# Patient Record
Sex: FEMALE | Race: WHITE | NOT HISPANIC OR LATINO | Employment: FULL TIME | URBAN - METROPOLITAN AREA
[De-identification: names, ages, dates, MRNs, and addresses within clinical notes are randomized per-mention and may not be internally consistent; named-entity substitution may affect disease eponyms.]

---

## 2017-07-18 ENCOUNTER — HOSPITAL ENCOUNTER (OUTPATIENT)
Dept: CARDIOLOGY | Facility: HOSPITAL | Age: 22
Discharge: HOME | End: 2017-07-18
Attending: INTERNAL MEDICINE | Admitting: INTERNAL MEDICINE

## 2019-07-12 ENCOUNTER — OFFICE VISIT (OUTPATIENT)
Dept: FAMILY MEDICINE CLINIC | Facility: CLINIC | Age: 24
End: 2019-07-12

## 2019-07-12 VITALS
HEIGHT: 63 IN | SYSTOLIC BLOOD PRESSURE: 108 MMHG | TEMPERATURE: 97.4 F | BODY MASS INDEX: 23.74 KG/M2 | OXYGEN SATURATION: 98 % | HEART RATE: 88 BPM | DIASTOLIC BLOOD PRESSURE: 71 MMHG | WEIGHT: 134 LBS

## 2019-07-12 DIAGNOSIS — L71.0 PERIORAL DERMATITIS: Primary | ICD-10-CM

## 2019-07-12 PROBLEM — R00.2 PALPITATION: Status: ACTIVE | Noted: 2017-06-15

## 2019-07-12 PROCEDURE — 99213 OFFICE O/P EST LOW 20 MIN: CPT | Performed by: FAMILY MEDICINE

## 2019-07-12 RX ORDER — SERTRALINE HYDROCHLORIDE 25 MG/1
TABLET, FILM COATED ORAL
Refills: 0 | COMMUNITY
Start: 2019-07-03 | End: 2019-07-26

## 2019-07-12 NOTE — PROGRESS NOTES
"Subjective   Chief Complaint   Patient presents with   • Rash     on face     Alaina Arzate is a 23 y.o. female.     Patient Care Team:  Maria M Villeda MD as PCP - General    She is coming in today with her mother to discuss skin rash on her face.  She reports that she first noted some redness and skin irritation on her face 3 days ago.  It is around her mouth and is slightly spreading on the right side to the cheek area but it is also on the left side of her lips.  It is slightly itchy.  She denies any other rashes.  She has tried over-the-counter cortisone cream but that did not help actually made lipid worse.  She reports that she recently started some new body wash and she wonders if this is irritating her skin.       The following portions of the patient's history were reviewed and updated as appropriate: allergies, current medications, past family history, past medical history, past social history, past surgical history and problem list.    Social History     Socioeconomic History   • Marital status: Single     Spouse name: Not on file   • Number of children: Not on file   • Years of education: Not on file   • Highest education level: Not on file       Review of Systems   Constitutional: Negative for chills, fatigue and fever.   Respiratory: Negative for shortness of breath, wheezing and stridor.    Skin: Positive for rash. Negative for color change, dry skin, pallor and skin lesions.   Hematological: Negative for adenopathy. Does not bruise/bleed easily.     Visit Vitals  /71 (BP Location: Right arm, Patient Position: Sitting, Cuff Size: Adult)   Pulse 88   Temp 97.4 °F (36.3 °C) (Axillary)   Ht 160 cm (63\")   Wt 60.8 kg (134 lb)   SpO2 98%   BMI 23.74 kg/m²       Current Outpatient Medications:   •  sertraline (ZOLOFT) 25 MG tablet, , Disp: , Rfl: 0    Objective   Physical Exam   Constitutional: She is oriented to person, place, and time. She appears well-developed and well-nourished.   HENT: "   Head: Normocephalic and atraumatic.   Eyes: Conjunctivae and EOM are normal. Pupils are equal, round, and reactive to light.   Neck: Normal range of motion. Neck supple.   Neurological: She is alert and oriented to person, place, and time.   Skin: Skin is warm and dry.   There is mild macular rash noted on face around the oral area sparing the vermilion line.  No signs of infection or inflammation.  No petechiae.   Nursing note and vitals reviewed.               Assessment/Plan   Problems Addressed this Visit        Musculoskeletal and Integument    Perioral dermatitis - Primary      I suspect that her skin issues are due to perioral dermatitis.  This was discussed with the patient and her mother in details.  Skin care was discussed she was advised to stop using any skin products with fragrances.  She will use Cetaphil for cleansing and is a lotion.  I also gave her samples of Eucrisa cream to use as needed to see if this is going to help with  her skin issues.  She is to call us back if not any better.  Other approaches might need to be tried at this point.  All questions were answered.         Requested Prescriptions      No prescriptions requested or ordered in this encounter

## 2019-07-26 ENCOUNTER — OFFICE VISIT (OUTPATIENT)
Dept: FAMILY MEDICINE CLINIC | Facility: CLINIC | Age: 24
End: 2019-07-26

## 2019-07-26 VITALS
BODY MASS INDEX: 22.88 KG/M2 | OXYGEN SATURATION: 100 % | HEIGHT: 64 IN | HEART RATE: 81 BPM | TEMPERATURE: 98.5 F | DIASTOLIC BLOOD PRESSURE: 69 MMHG | SYSTOLIC BLOOD PRESSURE: 106 MMHG | WEIGHT: 134 LBS

## 2019-07-26 DIAGNOSIS — F41.9 ANXIETY: ICD-10-CM

## 2019-07-26 DIAGNOSIS — Z00.00 WELLNESS EXAMINATION: Primary | ICD-10-CM

## 2019-07-26 LAB
ALBUMIN SERPL-MCNC: 4.1 G/DL (ref 3.5–4.8)
ALBUMIN/GLOB SERPL: 1.4 G/DL (ref 1–1.7)
ALP SERPL-CCNC: 36 U/L (ref 32–91)
ALT SERPL W P-5'-P-CCNC: 14 U/L (ref 14–54)
ANION GAP SERPL CALCULATED.3IONS-SCNC: 11.3 MMOL/L (ref 5–15)
ARTICHOKE IGE QN: 120 MG/DL (ref 0–100)
AST SERPL-CCNC: 17 U/L (ref 15–41)
BILIRUB SERPL-MCNC: 0.8 MG/DL (ref 0.3–1.2)
BUN BLD-MCNC: 6 MG/DL (ref 8–20)
BUN/CREAT SERPL: 12 (ref 5.4–26.2)
CALCIUM SPEC-SCNC: 9.3 MG/DL (ref 8.9–10.3)
CHLORIDE SERPL-SCNC: 104 MMOL/L (ref 101–111)
CHOLEST SERPL-MCNC: 182 MG/DL
CO2 SERPL-SCNC: 25 MMOL/L (ref 22–32)
CREAT BLD-MCNC: 0.5 MG/DL (ref 0.4–1)
GFR SERPL CREATININE-BSD FRML MDRD: >150 ML/MIN/1.73
GLOBULIN UR ELPH-MCNC: 3 GM/DL (ref 2.5–3.8)
GLUCOSE BLD-MCNC: 91 MG/DL (ref 65–99)
HDLC SERPL QL: 3.79
HDLC SERPL-MCNC: 48 MG/DL
LDLC/HDLC SERPL: 2.43 {RATIO}
POTASSIUM BLD-SCNC: 4.3 MMOL/L (ref 3.6–5.1)
PROT SERPL-MCNC: 7.1 G/DL (ref 6.1–7.9)
SODIUM BLD-SCNC: 136 MMOL/L (ref 136–144)
TRIGL SERPL-MCNC: 88 MG/DL
VLDLC SERPL-MCNC: 17.6 MG/DL

## 2019-07-26 PROCEDURE — 80053 COMPREHEN METABOLIC PANEL: CPT | Performed by: FAMILY MEDICINE

## 2019-07-26 PROCEDURE — 99395 PREV VISIT EST AGE 18-39: CPT | Performed by: FAMILY MEDICINE

## 2019-07-26 PROCEDURE — 90471 IMMUNIZATION ADMIN: CPT | Performed by: FAMILY MEDICINE

## 2019-07-26 PROCEDURE — 36415 COLL VENOUS BLD VENIPUNCTURE: CPT | Performed by: FAMILY MEDICINE

## 2019-07-26 PROCEDURE — 80061 LIPID PANEL: CPT | Performed by: FAMILY MEDICINE

## 2019-07-26 PROCEDURE — 90651 9VHPV VACCINE 2/3 DOSE IM: CPT | Performed by: FAMILY MEDICINE

## 2019-07-26 RX ORDER — OLOPATADINE HYDROCHLORIDE 2 MG/ML
SOLUTION/ DROPS OPHTHALMIC
COMMUNITY
Start: 2019-07-19 | End: 2020-09-30

## 2019-07-26 NOTE — PROGRESS NOTES
"Subjective   Chief Complaint   Patient presents with   • Annual Exam     and fasting labs     Alaina Arzate is a 23 y.o. female.     Subjective  Alaina Arzate is an 23 y.o. female who presents for [facility] physical exam. She denies any current medical problems or concerns. Questionnaire and forms reviewed with patient and responses verified. Immunizations are up to date. Patient has received 2 doses of MMR vaccine. Varicella immunity: confirmed by vaccine administration.    The following portions of the patient's history were reviewed and updated as appropriate: allergies, current medications, past family history, past medical history, past social history, past surgical history and problem list.    Review of Systems  A comprehensive review of systems was negative.      Objective   /69 (BP Location: Right arm, Patient Position: Sitting, Cuff Size: Adult)   Pulse 81   Temp 98.5 °F (36.9 °C) (Oral)   Ht 161.9 cm (63.75\")   Wt 60.8 kg (134 lb)   SpO2 100%   BMI 23.18 kg/m²   /69 (BP Location: Right arm, Patient Position: Sitting, Cuff Size: Adult)   Pulse 81   Temp 98.5 °F (36.9 °C) (Oral)   Ht 161.9 cm (63.75\")   Wt 60.8 kg (134 lb)   SpO2 100%   BMI 23.18 kg/m²   General appearance: alert, appears stated age and cooperative  Head: Normocephalic, without obvious abnormality, atraumatic  Eyes: conjunctivae/corneas clear. PERRL, EOM's intact. Fundi benign.  Ears: normal TM's and external ear canals both ears  Throat: lips, mucosa, and tongue normal; teeth and gums normal  Neck: no adenopathy, no carotid bruit, no JVD, supple, symmetrical, trachea midline and thyroid not enlarged, symmetric, no tenderness/mass/nodules  Lungs: clear to auscultation bilaterally  Heart: regular rate and rhythm, S1, S2 normal, no murmur, click, rub or gallop  Abdomen: soft, non-tender; bowel sounds normal; no masses,  no organomegaly  Extremities: extremities normal, atraumatic, no cyanosis or edema  Skin: Skin " color, texture, turgor normal. No rashes or lesions  Lymph nodes: Cervical, supraclavicular, and axillary nodes normal.  Neurologic: Grossly normal     Assessment/Plan  Satisfactory college physical exam.     1. Completed, signed and returned forms.  2. Reviewed health maintenance, contraception, STDs, and substance abuse.  3. Follow up will be as needed.               History reviewed. No pertinent past medical history.  History reviewed. No pertinent surgical history.  No Known Allergies  Social History     Socioeconomic History   • Marital status: Single     Spouse name: Not on file   • Number of children: Not on file   • Years of education: Not on file   • Highest education level: Not on file   Tobacco Use   • Smoking status: Never Smoker   • Smokeless tobacco: Never Used   Substance and Sexual Activity   • Alcohol use: No     Frequency: Never   • Drug use: No     Social History     Tobacco Use   Smoking Status Never Smoker   Smokeless Tobacco Never Used       family history is not on file.  Current Outpatient Medications on File Prior to Visit   Medication Sig Dispense Refill   • olopatadine (PATADAY) 0.2 % solution ophthalmic solution      • [DISCONTINUED] sertraline (ZOLOFT) 25 MG tablet   0     No current facility-administered medications on file prior to visit.      Patient Active Problem List   Diagnosis   • Palpitation   • Perioral dermatitis   • Wellness examination       The following portions of the patient's history were reviewed and updated as appropriate: allergies, current medications, past family history, past medical history, past social history, past surgical history and problem list.    Review of Systems   Constitutional: Negative for chills and fever.   HENT: Negative for sinus pressure and sore throat.    Eyes: Negative for blurred vision.   Respiratory: Negative for cough and shortness of breath.    Cardiovascular: Negative for chest pain and palpitations.   Gastrointestinal: Negative for  "abdominal pain.   Endocrine: Negative for polyuria.   Skin: Negative for rash.   Neurological: Negative for dizziness and headache.   Hematological: Negative for adenopathy.   Psychiatric/Behavioral: Negative for depressed mood.       Objective   /69 (BP Location: Right arm, Patient Position: Sitting, Cuff Size: Adult)   Pulse 81   Temp 98.5 °F (36.9 °C) (Oral)   Ht 161.9 cm (63.75\")   Wt 60.8 kg (134 lb)   SpO2 100%   BMI 23.18 kg/m²   Physical Exam   Constitutional: She is oriented to person, place, and time. She appears well-developed. No distress.   HENT:   Head: Normocephalic.   Right Ear: Hearing, tympanic membrane and ear canal normal.   Left Ear: Hearing, tympanic membrane and ear canal normal.   Eyes: Conjunctivae, EOM and lids are normal. Pupils are equal, round, and reactive to light.   Neck: Normal range of motion. Neck supple. No thyroid mass and no thyromegaly present.   Cardiovascular: Normal rate and regular rhythm.   Pulmonary/Chest: Effort normal and breath sounds normal.   Abdominal: Soft. Bowel sounds are normal. There is no tenderness.   Musculoskeletal: Normal range of motion.   Lymphadenopathy:     She has no cervical adenopathy.   Neurological: She is alert and oriented to person, place, and time.   Skin: Skin is warm and dry.   Psychiatric: She has a normal mood and affect. Her speech is normal.   Nursing note and vitals reviewed.      No visits with results within 1 Week(s) from this visit.   Latest known visit with results is:   No results found for any previous visit.           Assessment/Plan   Problems Addressed this Visit        Other    Wellness examination - Primary    Relevant Orders    Comprehensive Metabolic Panel    Lipid Panel          Alaina was seen today for annual exam.    Diagnoses and all orders for this visit:    Wellness examination  -     Comprehensive Metabolic Panel  -     Lipid Panel    Other orders  -     sertraline (ZOLOFT) 50 MG tablet; Take 1 tablet " by mouth Daily.

## 2019-07-26 NOTE — PATIENT INSTRUCTIONS
Preventive Care for Young Adults, Female  The transition to life after high school as a young adult can be a stressful time with many changes. You may start seeing a primary care physician instead of a pediatrician. This is the time when your health care becomes your responsibility.  Preventive care refers to lifestyle choices and visits with your health care provider that can promote health and wellness.  What does preventive care include?  · A yearly physical exam. This is also called an annual wellness visit.  · Dental exams once or twice a year.  · Routine eye exams. Ask your health care provider how often you should have your eyes checked.  · Personal lifestyle choices, including:  ? Daily care of your teeth and gums.  ? Regular physical activity.  ? Eating a healthy diet.  ? Avoiding tobacco and drug use.  ? Avoiding or limiting alcohol use.  ? Practicing safe sex.  ? Taking vitamin and mineral supplements as recommended by your health care provider.  What happens during an annual wellness visit?  Preventive care starts with a yearly visit to your primary care physician. The services and screenings done by your health care provider during your annual wellness visit will depend on your overall health, lifestyle risk factors, and family history of disease.  Counseling  Your health care provider may ask you questions about:  · Past medical problems and your family’s medical history.  · Medicines or supplements you take.  · Health insurance and access to health care.  · Alcohol, tobacco, and drug use.  · Your safety at home, work, or school.  · Access to firearms.  · Emotional well-being and how you cope with stress.  · Relationship well-being.  · Diet, exercise, and sleep habits.  · Your sexual health and activity.  · Your methods of birth control.  · Your menstrual cycle.  · Your pregnancy history.    Screening  You may have the following tests or measurements:  · Height, weight, and BMI.  · Blood  pressure.  · Lipid and cholesterol levels.  · Tuberculosis skin test.  · Skin exam.  · Vision and hearing tests.  · Screening test for hepatitis.  · Screening tests for sexually transmitted diseases (STDs), if you are at risk.  · BRCA-related cancer screening. This may be done if you have a family history of breast, ovarian, tubal, or peritoneal cancers.  · Pelvic exam and Pap test. This may be done every 3 years starting at age 21.    Vaccines  Your health care provider may recommend certain vaccines, such as:  · Influenza vaccine. This is recommended every year.  · Tetanus, diphtheria, and acellular pertussis (Tdap, Td) vaccine. You may need a Td booster every 10 years.  · Varicella vaccine. You may need this if you have not been vaccinated.  · HPV vaccine. If you are 26 or younger, you may need three doses over 6 months.  · Measles, mumps, and rubella (MMR) vaccine. You may need at least one dose of MMR. You may also need a second dose.  · Pneumococcal 13-valent conjugate (PCV13) vaccine. You may need this if you have certain conditions and were not previously vaccinated.  · Pneumococcal polysaccharide (PPSV23) vaccine. You may need one or two doses if you smoke cigarettes or if you have certain conditions.  · Meningococcal vaccine. One dose is recommended if you are age 19-21 years and a first-year college student living in a residence day, or if you have one of several medical conditions. You may also need additional booster doses.  · Hepatitis A vaccine. You may need this if you have certain conditions or if you travel or work in places where you may be exposed to hepatitis A.  · Hepatitis B vaccine. You may need this if you have certain conditions or if you travel or work in places where you may be exposed to hepatitis B.  · Haemophilus influenzae type b (Hib) vaccine. You may need this if you have certain risk factors.    Talk to your health care provider about which screenings and vaccines you need and how  often you need them.  What steps can I take to develop healthy behaviors?  · Have regular preventive health care visits with your primary care physician and dentist.  · Eat a healthy diet.  · Drink enough fluid to keep your urine pale yellow.  · Stay active. Exercise at least 30 minutes 5 or more days of the week.  · Use alcohol responsibly.  · Maintain a healthy weight.  · Do not use any products that contain nicotine, such as cigarettes, chewing tobacco, and e-cigarettes. If you need help quitting, ask your health care provider.  · Do not use drugs.  · Practice safe sex.  · Use birth control (contraception) to prevent unwanted pregnancy. If you plan to become pregnant, see your health care provider for a pre-conception visit.  · Find healthy ways to manage stress.  How can I protect myself from injury?  Injuries from violence or accidents are the leading cause of death among young adults and can often be prevented. Take these steps to help protect yourself:  · Always wear your seat belt while driving or riding in a vehicle.  · Do not drive if you have been drinking alcohol. Do not ride with someone who has been drinking.  · Do not drive when you are tired or distracted. Do not text while driving.  · Wear a helmet and other protective equipment during sports activities.  · If you have firearms in your house, make sure you follow all gun safety procedures.  · Seek help if you have been bullied, physically abused, or sexually abused.  · Use the Internet responsibly to avoid dangers such as online bullying and online sexual predators.    What can I do to cope with stress?  Young adults may face many new challenges that can be stressful, such as finding a job, going to college, moving away from home, managing money, being in a relationship, getting , and having children. To manage stress:  · Avoid known stressful situations when you can.  · Exercise regularly.  · Find a stress-reducing activity that works best  for you. Examples include meditation, yoga, listening to music, or reading.  · Spend time in nature.  · Keep a journal to write about your stress and how you respond.  · Talk to your health care provider about stress. He or she may suggest counseling.  · Spend time with supportive friends or family.  · Do not cope with stress by:  ? Drinking alcohol or using drugs.  ? Smoking cigarettes.  ? Eating.    Where can I get more information?  Learn more about preventive care and healthy habits from:  · American College of Obstetricians and Gynecologists: www.acog.org/Patients  · U.S. Preventive Services Task Force: www.uspreventiveservicestaskforce.org/Tools/ConsumerInfo/Index/information-for-consumers  · National Adolescent and Young Adult Health Information Center: http://nahic.[facility]/resource-center/  · American Academy of Pediatrics Bright Futures: https://brightfutures.aap.org  · Society for Adolescent Health and Medicine: www.adolescenthealth.org/Resources/Clinical-Care-Resources/Mental-Health/Mental-Health-Resources-For-Adolesc.aspx  · HealthCare.gov: www.healthcare.gov/young-adults/coverage/    This information is not intended to replace advice given to you by your health care provider. Make sure you discuss any questions you have with your health care provider.  Document Released: 05/04/2017 Document Revised: 07/31/2018 Document Reviewed: 05/04/2017  Elsevier Interactive Patient Education © 2019 [employer]vier Inc[employer]

## 2019-07-29 ENCOUNTER — CLINICAL SUPPORT (OUTPATIENT)
Dept: FAMILY MEDICINE CLINIC | Facility: CLINIC | Age: 24
End: 2019-07-29

## 2019-07-29 DIAGNOSIS — Z11.1 SCREENING EXAMINATION FOR PULMONARY TUBERCULOSIS: Primary | ICD-10-CM

## 2019-07-29 PROCEDURE — 86580 TB INTRADERMAL TEST: CPT | Performed by: FAMILY MEDICINE

## 2019-08-01 ENCOUNTER — CLINICAL SUPPORT (OUTPATIENT)
Dept: FAMILY MEDICINE CLINIC | Facility: CLINIC | Age: 24
End: 2019-08-01

## 2019-08-01 LAB
INDURATION: 0 MM (ref 0–10)
Lab: NORMAL
Lab: NORMAL
TB SKIN TEST: NEGATIVE

## 2019-08-05 ENCOUNTER — CLINICAL SUPPORT (OUTPATIENT)
Dept: FAMILY MEDICINE CLINIC | Facility: CLINIC | Age: 24
End: 2019-08-05

## 2019-08-05 DIAGNOSIS — Z11.1 SCREENING EXAMINATION FOR PULMONARY TUBERCULOSIS: Primary | ICD-10-CM

## 2019-08-05 PROCEDURE — 86580 TB INTRADERMAL TEST: CPT | Performed by: FAMILY MEDICINE

## 2019-08-07 LAB
INDURATION: 0 MM (ref 0–10)
Lab: NORMAL
Lab: NORMAL
TB SKIN TEST: NEGATIVE

## 2019-10-02 ENCOUNTER — FLU SHOT (OUTPATIENT)
Dept: FAMILY MEDICINE CLINIC | Facility: CLINIC | Age: 24
End: 2019-10-02

## 2019-10-02 DIAGNOSIS — Z23 NEED FOR IMMUNIZATION AGAINST INFLUENZA: Primary | ICD-10-CM

## 2019-10-02 PROCEDURE — 90471 IMMUNIZATION ADMIN: CPT | Performed by: FAMILY MEDICINE

## 2019-10-02 PROCEDURE — 90674 CCIIV4 VAC NO PRSV 0.5 ML IM: CPT | Performed by: FAMILY MEDICINE

## 2019-11-21 RX ORDER — CLONAZEPAM 0.5 MG/1
0.5 TABLET ORAL 2 TIMES DAILY PRN
Qty: 20 TABLET | Refills: 0 | Status: SHIPPED | OUTPATIENT
Start: 2019-11-21 | End: 2021-10-19

## 2019-12-04 ENCOUNTER — TELEPHONE (OUTPATIENT)
Dept: FAMILY MEDICINE CLINIC | Facility: CLINIC | Age: 24
End: 2019-12-04

## 2019-12-04 NOTE — TELEPHONE ENCOUNTER
I spoke to the pts mom and they are getting a bill for $144.64 because the pt was seen in March and in July and both visits were coded as well exam 22004, can the most recent visit be changed?

## 2020-04-22 RX ORDER — ERYTHROMYCIN 5 MG/G
OINTMENT OPHTHALMIC NIGHTLY
Qty: 3.5 G | Refills: 1 | Status: SHIPPED | OUTPATIENT
Start: 2020-04-22 | End: 2020-09-30

## 2020-09-30 ENCOUNTER — OFFICE VISIT (OUTPATIENT)
Dept: FAMILY MEDICINE CLINIC | Facility: CLINIC | Age: 25
End: 2020-09-30

## 2020-09-30 VITALS
BODY MASS INDEX: 21.28 KG/M2 | DIASTOLIC BLOOD PRESSURE: 75 MMHG | OXYGEN SATURATION: 98 % | WEIGHT: 123 LBS | HEART RATE: 92 BPM | TEMPERATURE: 98.9 F | SYSTOLIC BLOOD PRESSURE: 128 MMHG

## 2020-09-30 DIAGNOSIS — Z23 NEED FOR VACCINATION: ICD-10-CM

## 2020-09-30 DIAGNOSIS — R20.2 RIGHT HAND PARESTHESIA: ICD-10-CM

## 2020-09-30 DIAGNOSIS — F41.9 ANXIETY: Primary | ICD-10-CM

## 2020-09-30 DIAGNOSIS — H01.004 BLEPHARITIS OF LEFT UPPER EYELID, UNSPECIFIED TYPE: ICD-10-CM

## 2020-09-30 PROCEDURE — 90686 IIV4 VACC NO PRSV 0.5 ML IM: CPT | Performed by: FAMILY MEDICINE

## 2020-09-30 PROCEDURE — 99214 OFFICE O/P EST MOD 30 MIN: CPT | Performed by: FAMILY MEDICINE

## 2020-09-30 PROCEDURE — 90471 IMMUNIZATION ADMIN: CPT | Performed by: FAMILY MEDICINE

## 2020-09-30 RX ORDER — TOBRAMYCIN AND DEXAMETHASONE 3; 1 MG/ML; MG/ML
1 SUSPENSION/ DROPS OPHTHALMIC 2 TIMES DAILY
Qty: 5 ML | Refills: 1 | Status: SHIPPED | OUTPATIENT
Start: 2020-09-30 | End: 2021-10-19

## 2020-09-30 RX ORDER — METRONIDAZOLE 500 MG/1
500 TABLET ORAL 2 TIMES DAILY
COMMUNITY
Start: 2020-09-24 | End: 2021-10-19

## 2020-09-30 RX ORDER — ESCITALOPRAM OXALATE 5 MG/1
5 TABLET ORAL DAILY
Qty: 90 TABLET | Refills: 1 | Status: SHIPPED | OUTPATIENT
Start: 2020-09-30 | End: 2021-04-16 | Stop reason: SDUPTHER

## 2020-09-30 NOTE — PROGRESS NOTES
Subjective   Chief Complaint   Patient presents with   • Anxiety   • Eye Problem     knot on lt eyelid     Alaina Arzate is a 24 y.o. female.     Numbness of right hand after using scissors for a long time at work.  Looking for a new job.  Had a job interview yesterday.     She has had some swelling of her left upper eye lid.  She saw her dermatologist recently for other issues and asked them about it.  They would not address it and told her to go to see an ophthalmologist.     Anxiety  Presents for follow-up visit. Symptoms include excessive worry, nervous/anxious behavior and panic. Patient reports no chest pain, confusion, decreased concentration, depressed mood, dizziness, feeling of choking, muscle tension, palpitations or shortness of breath. Symptoms occur most days. The severity of symptoms is causing significant distress. The quality of sleep is good.       Eye Problem   The left eye is affected. This is a new problem. The current episode started in the past 7 days. The problem occurs constantly. The problem has been unchanged. There was no injury mechanism. The patient is experiencing no pain. There is no known exposure to pink eye. Pertinent negatives include no blurred vision, eye discharge, double vision, eye redness, fever, foreign body sensation, itching, photophobia or recent URI. She has tried nothing for the symptoms.      No past medical history on file.  No past surgical history on file.  No Known Allergies  Social History     Socioeconomic History   • Marital status: Single     Spouse name: Not on file   • Number of children: Not on file   • Years of education: Not on file   • Highest education level: Not on file   Tobacco Use   • Smoking status: Never Smoker   • Smokeless tobacco: Never Used   Substance and Sexual Activity   • Alcohol use: No     Frequency: Never   • Drug use: No     Social History     Tobacco Use   Smoking Status Never Smoker   Smokeless Tobacco Never Used       family  history is not on file.  Current Outpatient Medications on File Prior to Visit   Medication Sig Dispense Refill   • metroNIDAZOLE (FLAGYL) 500 MG tablet Take 500 mg by mouth 2 (Two) Times a Day.     • triamcinolone (KENALOG) 0.1 % ointment APPLY SPARINGLY TO AFFECTED AREA QD TO BID     • clonazePAM (KlonoPIN) 0.5 MG tablet Take 1 tablet by mouth 2 (Two) Times a Day As Needed for Anxiety. 20 tablet 0   • [DISCONTINUED] erythromycin (ROMYCIN) 5 MG/GM ophthalmic ointment Administer  to both eyes Every Night. 3.5 g 1   • [DISCONTINUED] olopatadine (PATADAY) 0.2 % solution ophthalmic solution      • [DISCONTINUED] sertraline (ZOLOFT) 50 MG tablet Take 1 tablet by mouth Daily. 90 tablet 1     No current facility-administered medications on file prior to visit.      Patient Active Problem List   Diagnosis   • Palpitation   • Perioral dermatitis   • Wellness examination       The following portions of the patient's history were reviewed and updated as appropriate: allergies, current medications, past family history, past medical history, past social history, past surgical history and problem list.    Review of Systems   Constitutional: Negative for chills and fever.   HENT: Negative for sinus pressure, sore throat and swollen glands.    Eyes: Negative for blurred vision, double vision, photophobia, pain, discharge, redness and itching.   Respiratory: Negative for cough, shortness of breath and wheezing.    Cardiovascular: Negative for chest pain and palpitations.   Gastrointestinal: Negative for abdominal pain.   Endocrine: Negative for polyuria.   Genitourinary: Negative for difficulty urinating.   Skin: Negative for rash.   Neurological: Negative for dizziness, seizures, headache and confusion.   Hematological: Negative for adenopathy.   Psychiatric/Behavioral: Negative for decreased concentration and depressed mood. The patient is nervous/anxious.        Objective   /75 (BP Location: Right arm, Patient Position:  Sitting, Cuff Size: Adult)   Pulse 92   Temp 98.9 °F (37.2 °C)   Wt 55.8 kg (123 lb)   SpO2 98%   BMI 21.28 kg/m²   Physical Exam  Constitutional:       General: She is not in acute distress.     Appearance: She is well-developed.   HENT:      Head: Normocephalic.   Eyes:      General: Lids are normal.      Conjunctiva/sclera: Conjunctivae normal.   Neck:      Musculoskeletal: Normal range of motion.      Thyroid: No thyroid mass or thyromegaly.      Trachea: Trachea normal.   Cardiovascular:      Rate and Rhythm: Normal rate and regular rhythm.      Heart sounds: Normal heart sounds.   Pulmonary:      Effort: Pulmonary effort is normal.      Breath sounds: Normal breath sounds.   Abdominal:      Palpations: Abdomen is soft.   Musculoskeletal:      Comments: Right index finger with mild lateral swelling.  Left upper eye lid with small papule and mild lid irritation.   Lymphadenopathy:      Cervical: No cervical adenopathy.   Skin:     General: Skin is warm and dry.   Neurological:      Mental Status: She is alert and oriented to person, place, and time.   Psychiatric:         Attention and Perception: She is attentive.         Mood and Affect: Mood normal.         Speech: Speech normal.         Behavior: Behavior normal.         No visits with results within 1 Week(s) from this visit.   Latest known visit with results is:   Clinical Support on 08/05/2019   Component Date Value Ref Range Status   • TB Skin Test 08/07/2019 Negative   Final   • Induration 08/07/2019 0  0 - 10 mm Final   • Injection Date & Time 08/07/2019 8/5/2019 8:30am   Final   • Read Date & Time 08/07/2019 8/7/19 8:30am   Final           Assessment/Plan   Alaina was seen today for anxiety and eye problem.    Diagnoses and all orders for this visit:    Anxiety  Comments:  Previously on Zoloft.  Start Lexapro 5 mg  Orders:  -     escitalopram (Lexapro) 5 MG tablet; Take 1 tablet by mouth Daily.    Right hand paresthesia  Comments:  Due to overuse  of scissors    Blepharitis of left upper eyelid, unspecified type  Comments:  Try anti inlammatory eye drops.    Orders:  -     tobramycin-dexamethasone (TobraDex) 0.3-0.1 % ophthalmic suspension; Administer 1 drop to both eyes 2 (two) times a day.    Need for vaccination  -     FluLaval Quad >6 Months ([id number])  -     SCANNED - INFLUENZA

## 2020-11-24 ENCOUNTER — CLINICAL SUPPORT (OUTPATIENT)
Dept: FAMILY MEDICINE CLINIC | Facility: CLINIC | Age: 25
End: 2020-11-24

## 2020-11-24 DIAGNOSIS — R31.9 HEMATURIA, UNSPECIFIED TYPE: Primary | ICD-10-CM

## 2020-11-24 LAB
BILIRUB BLD-MCNC: NEGATIVE MG/DL
CLARITY, POC: ABNORMAL
COLOR UR: YELLOW
GLUCOSE UR STRIP-MCNC: NEGATIVE MG/DL
KETONES UR QL: NEGATIVE
LEUKOCYTE EST, POC: ABNORMAL
NITRITE UR-MCNC: NEGATIVE MG/ML
PH UR: 5.5 [PH] (ref 5–8)
PROT UR STRIP-MCNC: NEGATIVE MG/DL
RBC # UR STRIP: ABNORMAL /UL
SP GR UR: 1.02 (ref 1–1.03)
UROBILINOGEN UR QL: NORMAL

## 2020-11-24 PROCEDURE — 87086 URINE CULTURE/COLONY COUNT: CPT | Performed by: FAMILY MEDICINE

## 2020-11-24 PROCEDURE — 81003 URINALYSIS AUTO W/O SCOPE: CPT | Performed by: FAMILY MEDICINE

## 2020-11-24 RX ORDER — SULFAMETHOXAZOLE AND TRIMETHOPRIM 800; 160 MG/1; MG/1
1 TABLET ORAL 2 TIMES DAILY
Qty: 14 TABLET | Refills: 0 | Status: SHIPPED | OUTPATIENT
Start: 2020-11-24 | End: 2021-10-19

## 2020-11-25 LAB — BACTERIA SPEC AEROBE CULT: NO GROWTH

## 2021-04-16 DIAGNOSIS — F41.9 ANXIETY: ICD-10-CM

## 2021-04-16 RX ORDER — ESCITALOPRAM OXALATE 5 MG/1
5 TABLET ORAL DAILY
Qty: 90 TABLET | Refills: 1 | Status: SHIPPED | OUTPATIENT
Start: 2021-04-16 | End: 2021-06-25 | Stop reason: SDUPTHER

## 2021-06-25 DIAGNOSIS — F41.9 ANXIETY: ICD-10-CM

## 2021-06-25 RX ORDER — ESCITALOPRAM OXALATE 5 MG/1
5 TABLET ORAL DAILY
Qty: 101 TABLET | Refills: 1 | Status: SHIPPED | OUTPATIENT
Start: 2021-06-25 | End: 2021-09-28

## 2021-09-28 DIAGNOSIS — F41.9 ANXIETY: ICD-10-CM

## 2021-09-28 RX ORDER — ESCITALOPRAM OXALATE 5 MG/1
TABLET ORAL
Qty: 90 TABLET | Refills: 1 | Status: SHIPPED | OUTPATIENT
Start: 2021-09-28 | End: 2022-03-18 | Stop reason: SDUPTHER

## 2021-10-19 ENCOUNTER — OFFICE VISIT (OUTPATIENT)
Dept: FAMILY MEDICINE CLINIC | Facility: CLINIC | Age: 26
End: 2021-10-19

## 2021-10-19 VITALS
HEART RATE: 94 BPM | WEIGHT: 129 LBS | TEMPERATURE: 98.4 F | BODY MASS INDEX: 22.32 KG/M2 | OXYGEN SATURATION: 98 % | SYSTOLIC BLOOD PRESSURE: 100 MMHG | DIASTOLIC BLOOD PRESSURE: 69 MMHG

## 2021-10-19 DIAGNOSIS — J02.9 SORE THROAT: Primary | ICD-10-CM

## 2021-10-19 LAB
EXPIRATION DATE: NORMAL
INTERNAL CONTROL: NORMAL
Lab: NORMAL
S PYO AG THROAT QL: NEGATIVE

## 2021-10-19 PROCEDURE — 99213 OFFICE O/P EST LOW 20 MIN: CPT | Performed by: FAMILY MEDICINE

## 2021-10-19 PROCEDURE — 87880 STREP A ASSAY W/OPTIC: CPT | Performed by: FAMILY MEDICINE

## 2021-10-19 RX ORDER — AMOXICILLIN 500 MG/1
500 TABLET, FILM COATED ORAL 2 TIMES DAILY
Qty: 20 TABLET | Refills: 0 | Status: SHIPPED | OUTPATIENT
Start: 2021-10-19 | End: 2021-10-29

## 2021-10-19 RX ORDER — DROSPIRENONE AND ETHINYL ESTRADIOL 0.03MG-3MG
1 KIT ORAL DAILY
COMMUNITY
Start: 2021-10-05

## 2021-10-19 RX ORDER — TRETINOIN 1 MG/G
CREAM TOPICAL
COMMUNITY
Start: 2021-08-09 | End: 2022-03-18

## 2021-10-19 NOTE — ASSESSMENT & PLAN NOTE
Discussed conservative management,  salt water gargles and fluids.  Rx amoxicillin take as directed..

## 2021-10-19 NOTE — PROGRESS NOTES
Subjective   Alaina Arzate is a 26 y.o. female.     Sore Throat   This is a new problem. The current episode started in the past 7 days. The problem has been gradually worsening. There has been no fever. The pain is moderate. Associated symptoms include trouble swallowing. Pertinent negatives include no congestion, coughing, diarrhea, ear discharge, ear pain, headaches, hoarse voice, neck pain, shortness of breath or swollen glands. She has tried nothing for the symptoms.        The following portions of the patient's history were reviewed and updated as appropriate: past medical history, past social history, past surgical history and problem list.    Review of Systems   Constitutional: Negative for fever.   HENT: Positive for sore throat and trouble swallowing. Negative for congestion, ear discharge, ear pain, hoarse voice and swollen glands.    Respiratory: Negative for cough, shortness of breath and wheezing.    Gastrointestinal: Negative for diarrhea.   Musculoskeletal: Negative for neck pain.   Neurological: Negative for headache.       Objective   Physical Exam  Vitals reviewed.   Constitutional:       General: She is not in acute distress.  HENT:      Right Ear: Tympanic membrane, ear canal and external ear normal. There is no impacted cerumen.      Left Ear: Tympanic membrane, ear canal and external ear normal. There is no impacted cerumen.      Mouth/Throat:      Mouth: Mucous membranes are moist.      Pharynx: Posterior oropharyngeal erythema present.   Eyes:      Conjunctiva/sclera: Conjunctivae normal.   Pulmonary:      Effort: Pulmonary effort is normal.   Musculoskeletal:      Cervical back: Normal range of motion and neck supple. No tenderness.   Lymphadenopathy:      Cervical: Cervical adenopathy present.   Neurological:      Mental Status: She is alert and oriented to person, place, and time.       Vitals:    10/19/21 1136   BP: 100/69   Pulse: 94   Temp: 98.4 °F (36.9 °C)   SpO2: 98%     Current  Outpatient Medications on File Prior to Visit   Medication Sig Dispense Refill   • Valarie 3-0.03 MG per tablet Take 1 tablet by mouth Daily.     • tretinoin (RETIN-A) 0.1 % cream APPLY EXTERNALLY TO FACE EVERY NIGHT AT BEDTIME     • escitalopram (LEXAPRO) 5 MG tablet TAKE 1 TABLET BY MOUTH DAILY. TAKE 2 TABLETS DAILY THE WEEK PRIOR TO MENSES 90 tablet 1   • triamcinolone (KENALOG) 0.1 % ointment APPLY SPARINGLY TO AFFECTED AREA QD TO BID     • [DISCONTINUED] clonazePAM (KlonoPIN) 0.5 MG tablet Take 1 tablet by mouth 2 (Two) Times a Day As Needed for Anxiety. 20 tablet 0   • [DISCONTINUED] metroNIDAZOLE (FLAGYL) 500 MG tablet Take 500 mg by mouth 2 (Two) Times a Day.     • [DISCONTINUED] sulfamethoxazole-trimethoprim (Bactrim DS) 800-160 MG per tablet Take 1 tablet by mouth 2 (Two) Times a Day. 14 tablet 0   • [DISCONTINUED] tobramycin-dexamethasone (TobraDex) 0.3-0.1 % ophthalmic suspension Administer 1 drop to both eyes 2 (two) times a day. 5 mL 1     No current facility-administered medications on file prior to visit.           Assessment/Plan   Problems Addressed this Visit        ENT    Sore throat - Primary     Discussed conservative management,  salt water gargles and fluids.  Rx amoxicillin take as directed..         Relevant Orders    POCT rapid strep A      Diagnoses       Codes Comments    Sore throat    -  Primary ICD-10-CM: J02.9  ICD-9-CM: 462

## 2022-03-18 ENCOUNTER — OFFICE VISIT (OUTPATIENT)
Dept: FAMILY MEDICINE CLINIC | Facility: CLINIC | Age: 27
End: 2022-03-18

## 2022-03-18 ENCOUNTER — LAB (OUTPATIENT)
Dept: FAMILY MEDICINE CLINIC | Facility: CLINIC | Age: 27
End: 2022-03-18

## 2022-03-18 VITALS
BODY MASS INDEX: 21.62 KG/M2 | HEART RATE: 96 BPM | TEMPERATURE: 98.7 F | WEIGHT: 125 LBS | OXYGEN SATURATION: 98 % | DIASTOLIC BLOOD PRESSURE: 81 MMHG | SYSTOLIC BLOOD PRESSURE: 120 MMHG

## 2022-03-18 DIAGNOSIS — F41.9 ANXIETY: Primary | ICD-10-CM

## 2022-03-18 DIAGNOSIS — R55 NEAR SYNCOPE: ICD-10-CM

## 2022-03-18 LAB
ALBUMIN SERPL-MCNC: 4.4 G/DL (ref 3.5–5.2)
ALBUMIN/GLOB SERPL: 1.6 G/DL
ALP SERPL-CCNC: 37 U/L (ref 39–117)
ALT SERPL W P-5'-P-CCNC: 24 U/L (ref 1–33)
ANION GAP SERPL CALCULATED.3IONS-SCNC: 6.2 MMOL/L (ref 5–15)
AST SERPL-CCNC: 17 U/L (ref 1–32)
BASOPHILS # BLD AUTO: 0.04 10*3/MM3 (ref 0–0.2)
BASOPHILS NFR BLD AUTO: 0.8 % (ref 0–1.5)
BILIRUB SERPL-MCNC: 0.4 MG/DL (ref 0–1.2)
BUN SERPL-MCNC: 8 MG/DL (ref 6–20)
BUN/CREAT SERPL: 11.9 (ref 7–25)
CALCIUM SPEC-SCNC: 9.8 MG/DL (ref 8.6–10.5)
CHLORIDE SERPL-SCNC: 104 MMOL/L (ref 98–107)
CO2 SERPL-SCNC: 26.8 MMOL/L (ref 22–29)
CREAT SERPL-MCNC: 0.67 MG/DL (ref 0.57–1)
DEPRECATED RDW RBC AUTO: 39 FL (ref 37–54)
EGFRCR SERPLBLD CKD-EPI 2021: 125.3 ML/MIN/1.73
EOSINOPHIL # BLD AUTO: 0.07 10*3/MM3 (ref 0–0.4)
EOSINOPHIL NFR BLD AUTO: 1.4 % (ref 0.3–6.2)
ERYTHROCYTE [DISTWIDTH] IN BLOOD BY AUTOMATED COUNT: 11.5 % (ref 12.3–15.4)
GLOBULIN UR ELPH-MCNC: 2.7 GM/DL
GLUCOSE SERPL-MCNC: 90 MG/DL (ref 65–99)
HCT VFR BLD AUTO: 40.5 % (ref 34–46.6)
HGB BLD-MCNC: 13.4 G/DL (ref 12–15.9)
IMM GRANULOCYTES # BLD AUTO: 0.01 10*3/MM3 (ref 0–0.05)
IMM GRANULOCYTES NFR BLD AUTO: 0.2 % (ref 0–0.5)
LYMPHOCYTES # BLD AUTO: 1.08 10*3/MM3 (ref 0.7–3.1)
LYMPHOCYTES NFR BLD AUTO: 21 % (ref 19.6–45.3)
MCH RBC QN AUTO: 30.8 PG (ref 26.6–33)
MCHC RBC AUTO-ENTMCNC: 33.1 G/DL (ref 31.5–35.7)
MCV RBC AUTO: 93.1 FL (ref 79–97)
MONOCYTES # BLD AUTO: 0.44 10*3/MM3 (ref 0.1–0.9)
MONOCYTES NFR BLD AUTO: 8.6 % (ref 5–12)
NEUTROPHILS NFR BLD AUTO: 3.5 10*3/MM3 (ref 1.7–7)
NEUTROPHILS NFR BLD AUTO: 68 % (ref 42.7–76)
NRBC BLD AUTO-RTO: 0 /100 WBC (ref 0–0.2)
PLATELET # BLD AUTO: 285 10*3/MM3 (ref 140–450)
PMV BLD AUTO: 12.4 FL (ref 6–12)
POTASSIUM SERPL-SCNC: 4.5 MMOL/L (ref 3.5–5.2)
PROT SERPL-MCNC: 7.1 G/DL (ref 6–8.5)
RBC # BLD AUTO: 4.35 10*6/MM3 (ref 3.77–5.28)
SODIUM SERPL-SCNC: 137 MMOL/L (ref 136–145)
TSH SERPL DL<=0.05 MIU/L-ACNC: 1.19 UIU/ML (ref 0.27–4.2)
WBC NRBC COR # BLD: 5.14 10*3/MM3 (ref 3.4–10.8)

## 2022-03-18 PROCEDURE — 36415 COLL VENOUS BLD VENIPUNCTURE: CPT | Performed by: FAMILY MEDICINE

## 2022-03-18 PROCEDURE — 85025 COMPLETE CBC W/AUTO DIFF WBC: CPT | Performed by: FAMILY MEDICINE

## 2022-03-18 PROCEDURE — 99213 OFFICE O/P EST LOW 20 MIN: CPT | Performed by: FAMILY MEDICINE

## 2022-03-18 PROCEDURE — 80053 COMPREHEN METABOLIC PANEL: CPT | Performed by: FAMILY MEDICINE

## 2022-03-18 PROCEDURE — 84443 ASSAY THYROID STIM HORMONE: CPT | Performed by: FAMILY MEDICINE

## 2022-03-18 RX ORDER — ESCITALOPRAM OXALATE 5 MG/1
7.5 TABLET ORAL DAILY
Qty: 135 TABLET | Refills: 1
Start: 2022-03-18 | End: 2022-04-08

## 2022-03-18 NOTE — ASSESSMENT & PLAN NOTE
Stay hydrated.  Avoid caffeine.  May need forms completed for work for an accommodation to be able to change jobs.

## 2022-03-18 NOTE — PROGRESS NOTES
Chief Complaint  Anxiety (F/u, wants to discuss maybe changing medication or change dose. Also wanted to talk about her jaw, she had woke up with it locked )    Subjective          Alaina Arzate presents to [facility]  She was at work this week and doing a stressful, physical job for several hours at [employer] and felt like she was going to pass out.  She did not faint or pass out but just felt like she could.  Her symptoms lasted a few minutes and resolved spontaneously.   She get worse symptoms after squatting down and getting up suddenly - sees stars, lightheaded - resolves in a few minutes. No further symptoms since then.     Anxiety  Presents for follow-up visit. Symptoms include dizziness, nervous/anxious behavior and palpitations. Patient reports no chest pain or suicidal ideas. Symptoms occur most days. The severity of symptoms is interfering with daily activities. The quality of sleep is fair.           Objective   Vital Signs:   /81 (BP Location: Left arm, Patient Position: Sitting, Cuff Size: Adult)   Pulse 96   Temp 98.7 °F (37.1 °C) (Infrared)   Wt 56.7 kg (125 lb)   SpO2 98%   BMI 21.62 kg/m²     Physical Exam  Vitals and nursing note reviewed.   Constitutional:       General: She is not in acute distress.     Appearance: She is well-developed.   HENT:      Head: Normocephalic.   Eyes:      General: Lids are normal.   Neck:      Thyroid: No thyroid mass or thyromegaly.      Trachea: Trachea normal.   Cardiovascular:      Rate and Rhythm: Normal rate and regular rhythm.      Heart sounds: Normal heart sounds.   Pulmonary:      Effort: Pulmonary effort is normal.      Breath sounds: Normal breath sounds.   Musculoskeletal:      Cervical back: Normal range of motion.   Lymphadenopathy:      Cervical: No cervical adenopathy.   Skin:     General: Skin is warm and dry.   Neurological:      Mental Status: She is alert and oriented to person, place, and time.    Psychiatric:         Attention and Perception: She is attentive.         Mood and Affect: Mood normal.         Speech: Speech normal.         Behavior: Behavior normal.        Result Review :   The following data was reviewed by: Maria M Villeda MD on 03/18/2022:                Assessment and Plan    Diagnoses and all orders for this visit:    1. Anxiety (Primary)  -     escitalopram (LEXAPRO) 5 MG tablet; Take 1.5 tablets by mouth Daily.  Dispense: 135 tablet; Refill: 1    2. Near syncope  Assessment & Plan:  Stay hydrated.  Avoid caffeine.  May need forms completed for work for an accommodation to be able to change jobs.    Orders:  -     TSH  -     CBC & Differential  -     Comprehensive Metabolic Panel      Follow Up   No follow-ups on file.  Patient was given instructions and counseling regarding her condition or for health maintenance advice. Please see specific information pulled into the AVS if appropriate.

## 2022-04-08 DIAGNOSIS — F41.9 ANXIETY: ICD-10-CM

## 2022-04-08 RX ORDER — ESCITALOPRAM OXALATE 5 MG/1
TABLET ORAL
Qty: 90 TABLET | Refills: 1 | Status: SHIPPED | OUTPATIENT
Start: 2022-04-08 | End: 2022-05-20

## 2022-05-20 RX ORDER — ESCITALOPRAM OXALATE 10 MG/1
10 TABLET ORAL DAILY
Qty: 90 TABLET | Refills: 1 | Status: SHIPPED | OUTPATIENT
Start: 2022-05-20 | End: 2022-11-18

## 2022-09-30 ENCOUNTER — OFFICE VISIT (OUTPATIENT)
Dept: FAMILY MEDICINE CLINIC | Facility: CLINIC | Age: 27
End: 2022-09-30

## 2022-09-30 VITALS
WEIGHT: 125 LBS | DIASTOLIC BLOOD PRESSURE: 68 MMHG | HEIGHT: 63 IN | HEART RATE: 74 BPM | BODY MASS INDEX: 22.15 KG/M2 | RESPIRATION RATE: 16 BRPM | SYSTOLIC BLOOD PRESSURE: 103 MMHG | OXYGEN SATURATION: 97 % | TEMPERATURE: 98 F

## 2022-09-30 DIAGNOSIS — Z00.00 WELLNESS EXAMINATION: Primary | ICD-10-CM

## 2022-09-30 DIAGNOSIS — Z23 NEED FOR VACCINATION: ICD-10-CM

## 2022-09-30 PROBLEM — J02.9 SORE THROAT: Status: RESOLVED | Noted: 2021-10-19 | Resolved: 2022-09-30

## 2022-09-30 PROCEDURE — 99395 PREV VISIT EST AGE 18-39: CPT | Performed by: FAMILY MEDICINE

## 2022-09-30 PROCEDURE — 90471 IMMUNIZATION ADMIN: CPT | Performed by: FAMILY MEDICINE

## 2022-09-30 PROCEDURE — 90686 IIV4 VACC NO PRSV 0.5 ML IM: CPT | Performed by: FAMILY MEDICINE

## 2022-10-16 PROBLEM — Z23 NEED FOR VACCINATION: Status: ACTIVE | Noted: 2022-10-16

## 2022-11-18 RX ORDER — ESCITALOPRAM OXALATE 10 MG/1
10 TABLET ORAL DAILY
Qty: 90 TABLET | Refills: 1 | Status: SHIPPED | OUTPATIENT
Start: 2022-11-18

## 2022-11-28 ENCOUNTER — HOSPITAL ENCOUNTER (OUTPATIENT)
Facility: HOSPITAL | Age: 27
Discharge: HOME | End: 2022-11-28
Attending: EMERGENCY MEDICINE

## 2022-11-28 VITALS
TEMPERATURE: 99.5 F | RESPIRATION RATE: 18 BRPM | SYSTOLIC BLOOD PRESSURE: 109 MMHG | HEART RATE: 93 BPM | WEIGHT: 127 LBS | BODY MASS INDEX: 22.5 KG/M2 | DIASTOLIC BLOOD PRESSURE: 62 MMHG | OXYGEN SATURATION: 99 % | HEIGHT: 63 IN

## 2022-11-28 DIAGNOSIS — J10.1 INFLUENZA A: Primary | ICD-10-CM

## 2022-11-28 LAB
FLUAV SUBTYP SPEC NAA+PROBE: DETECTED
FLUBV RNA ISLT QL NAA+PROBE: NOT DETECTED
SARS-COV-2 RNA RESP QL NAA+PROBE: NOT DETECTED

## 2022-11-28 PROCEDURE — 87636 SARSCOV2 & INF A&B AMP PRB: CPT | Performed by: NURSE PRACTITIONER

## 2022-11-28 PROCEDURE — 87636 SARSCOV2 & INF A&B AMP PRB: CPT | Performed by: EMERGENCY MEDICINE

## 2022-11-28 PROCEDURE — EDLOS: Performed by: NURSE PRACTITIONER

## 2022-11-28 PROCEDURE — G0463 HOSPITAL OUTPT CLINIC VISIT: HCPCS | Performed by: NURSE PRACTITIONER

## 2022-11-28 PROCEDURE — 99203 OFFICE O/P NEW LOW 30 MIN: CPT | Performed by: NURSE PRACTITIONER

## 2023-03-10 ENCOUNTER — OFFICE VISIT (OUTPATIENT)
Dept: FAMILY MEDICINE CLINIC | Facility: CLINIC | Age: 28
End: 2023-03-10
Payer: COMMERCIAL

## 2023-03-10 VITALS
SYSTOLIC BLOOD PRESSURE: 104 MMHG | BODY MASS INDEX: 22.11 KG/M2 | WEIGHT: 124.8 LBS | TEMPERATURE: 98.2 F | HEIGHT: 63 IN | DIASTOLIC BLOOD PRESSURE: 69 MMHG | HEART RATE: 76 BPM | OXYGEN SATURATION: 96 %

## 2023-03-10 DIAGNOSIS — R30.0 DYSURIA: Primary | ICD-10-CM

## 2023-03-10 LAB
BILIRUB BLD-MCNC: NEGATIVE MG/DL
CLARITY, POC: CLEAR
COLOR UR: YELLOW
EXPIRATION DATE: ABNORMAL
GLUCOSE UR STRIP-MCNC: NEGATIVE MG/DL
KETONES UR QL: NEGATIVE
LEUKOCYTE EST, POC: NEGATIVE
Lab: ABNORMAL
NITRITE UR-MCNC: NEGATIVE MG/ML
PH UR: 8.5 [PH] (ref 5–8)
PROT UR STRIP-MCNC: NEGATIVE MG/DL
RBC # UR STRIP: NEGATIVE /UL
SP GR UR: 1.02 (ref 1–1.03)
UROBILINOGEN UR QL: ABNORMAL

## 2023-03-10 PROCEDURE — 81003 URINALYSIS AUTO W/O SCOPE: CPT | Performed by: FAMILY MEDICINE

## 2023-03-10 PROCEDURE — 99213 OFFICE O/P EST LOW 20 MIN: CPT | Performed by: FAMILY MEDICINE

## 2023-03-10 RX ORDER — TRIAMCINOLONE ACETONIDE 1 MG/G
CREAM TOPICAL
COMMUNITY
Start: 2023-02-17

## 2023-03-10 NOTE — PROGRESS NOTES
"Chief Complaint  Dysuria (X 2 days- with pelvic pain )    Subjective        Alaina Arzate presents to [facility]  Dysuria   This is a new problem. The current episode started in the past 7 days. The problem has been waxing and waning. The quality of the pain is described as burning. The pain is mild. There has been no fever. Pertinent negatives include no chills, flank pain, frequency, hematuria, nausea or urgency. She has tried increased fluids for the symptoms. The treatment provided moderate relief.       Objective   Vital Signs:  /69 (BP Location: Left arm, Patient Position: Sitting, Cuff Size: Small Adult)   Pulse 76   Temp 98.2 °F (36.8 °C) (Infrared)   Ht 160 cm (63\")   Wt 56.6 kg (124 lb 12.8 oz)   SpO2 96%   BMI 22.11 kg/m²   Estimated body mass index is 22.11 kg/m² as calculated from the following:    Height as of this encounter: 160 cm (63\").    Weight as of this encounter: 56.6 kg (124 lb 12.8 oz).       BMI is within normal parameters. No other follow-up for BMI required.      Physical Exam  Constitutional:       General: She is not in acute distress.     Appearance: Normal appearance. She is well-developed.   HENT:      Head: Normocephalic.   Eyes:      General: Lids are normal.   Neck:      Thyroid: No thyroid mass or thyromegaly.      Trachea: Trachea normal.   Cardiovascular:      Rate and Rhythm: Normal rate and regular rhythm.      Heart sounds: Normal heart sounds.   Pulmonary:      Effort: Pulmonary effort is normal.      Breath sounds: Normal breath sounds.   Abdominal:      Palpations: Abdomen is soft.   Musculoskeletal:      Cervical back: Normal range of motion.   Lymphadenopathy:      Cervical: No cervical adenopathy.   Skin:     General: Skin is warm and dry.   Neurological:      Mental Status: She is oriented to person, place, and time.   Psychiatric:         Attention and Perception: She is attentive.         Mood and Affect: Mood normal.   "       Speech: Speech normal.         Behavior: Behavior normal.        Result Review :  The following data was reviewed by: Maria M Villeda MD on 03/10/2023:  Common labs    Common Labs 3/18/22 3/18/22    1022 1022   Glucose  90   BUN  8   Creatinine  0.67   Sodium  137   Potassium  4.5   Chloride  104   Calcium  9.8   Albumin  4.40   Total Bilirubin  0.4   Alkaline Phosphatase  37 (A)   AST (SGOT)  17   ALT (SGPT)  24   WBC 5.14    Hemoglobin 13.4    Hematocrit 40.5    Platelets 285    (A) Abnormal value                         Assessment and Plan   Diagnoses and all orders for this visit:    1. Dysuria (Primary)  Assessment & Plan:  Symptoms have improved and her UA today is normal.  Continue pushing fluids and call back if symptoms return.  Tylenol 1-2 tabs po q4h prn      Orders:  -     POCT urinalysis dipstick, automated           Follow Up   No follow-ups on file.  Patient was given instructions and counseling regarding her condition or for health maintenance advice. Please see specific information pulled into the AVS if appropriate.

## 2023-03-10 NOTE — ASSESSMENT & PLAN NOTE
Symptoms have improved and her UA today is normal.  Continue pushing fluids and call back if symptoms return.  Tylenol 1-2 tabs po q4h prn

## 2023-03-20 DIAGNOSIS — G47.9 SLEEP DISTURBANCE: Primary | ICD-10-CM

## 2023-04-12 RX ORDER — ESCITALOPRAM OXALATE 10 MG/1
10 TABLET ORAL DAILY
Qty: 90 TABLET | Refills: 1 | Status: SHIPPED | OUTPATIENT
Start: 2023-04-12

## 2023-05-26 RX ORDER — BACLOFEN 10 MG/1
10 TABLET ORAL 3 TIMES DAILY PRN
Qty: 30 TABLET | Refills: 0 | Status: SHIPPED | OUTPATIENT
Start: 2023-05-26

## 2023-06-02 ENCOUNTER — OFFICE VISIT (OUTPATIENT)
Dept: FAMILY MEDICINE CLINIC | Facility: CLINIC | Age: 28
End: 2023-06-02
Payer: COMMERCIAL

## 2023-06-02 VITALS
BODY MASS INDEX: 22.61 KG/M2 | TEMPERATURE: 99.6 F | WEIGHT: 127.6 LBS | HEIGHT: 63 IN | SYSTOLIC BLOOD PRESSURE: 113 MMHG | HEART RATE: 82 BPM | OXYGEN SATURATION: 98 % | DIASTOLIC BLOOD PRESSURE: 73 MMHG

## 2023-06-02 DIAGNOSIS — M54.50 LEFT-SIDED LOW BACK PAIN WITHOUT SCIATICA, UNSPECIFIED CHRONICITY: Primary | ICD-10-CM

## 2023-06-02 PROCEDURE — 99213 OFFICE O/P EST LOW 20 MIN: CPT | Performed by: FAMILY MEDICINE

## 2023-06-02 RX ORDER — UREA 40 %
CREAM (GRAM) TOPICAL
COMMUNITY
Start: 2023-05-09 | End: 2023-06-18

## 2023-06-02 NOTE — PROGRESS NOTES
"Chief Complaint  Back Pain (X 1 month )    Subjective        Alaina Arzate presents to [facility]  History of Present Illness  She works at [employer].  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 She has had worse pain with this activity.  She is requesting a letter for work accommodations to allow her to not do that particular job.   Back Pain  This is a new problem. The current episode started more than 1 month ago. The problem occurs intermittently. The problem has been waxing and waning since onset. The pain is present in the lumbar spine and thoracic spine. The quality of the pain is described as aching. She has tried heat, ice and muscle relaxant for the symptoms. The treatment provided mild relief.     Objective   Vital Signs:  /73 (BP Location: Left arm, Patient Position: Sitting, Cuff Size: Adult)   Pulse 82   Temp 99.6 °F (37.6 °C) (Infrared)   Ht 160 cm (63\")   Wt 57.9 kg (127 lb 9.6 oz)   SpO2 98%   BMI 22.60 kg/m²   Estimated body mass index is 22.6 kg/m² as calculated from the following:    Height as of this encounter: 160 cm (63\").    Weight as of this encounter: 57.9 kg (127 lb 9.6 oz).       BMI is within normal parameters. No other follow-up for BMI required.      Physical Exam  Vitals and nursing note reviewed.   Constitutional:       General: She is not in acute distress.     Appearance: She is well-developed.   HENT:      Head: Normocephalic.   Eyes:      General: Lids are normal.   Neck:      Thyroid: No thyroid mass or thyromegaly.      Trachea: Trachea normal.   Cardiovascular:      Rate and Rhythm: Normal rate and regular rhythm.      Heart sounds: Normal heart sounds.   Pulmonary:      Effort: Pulmonary effort is normal.      Breath sounds: Normal breath sounds.   Musculoskeletal:      Cervical back: Normal range of motion.      Lumbar back: Tenderness present.   Lymphadenopathy:      Cervical: No cervical adenopathy. "   Skin:     General: Skin is warm and dry.   Neurological:      Mental Status: She is alert and oriented to person, place, and time.   Psychiatric:         Attention and Perception: She is attentive.         Mood and Affect: Mood normal.         Speech: Speech normal.         Behavior: Behavior normal.      Result Review :  The following data was reviewed by: Maria M Villeda MD on 06/02/2023:                   Assessment and Plan   Diagnoses and all orders for this visit:    1. Left-sided low back pain without sciatica, unspecified chronicity (Primary)  Assessment & Plan:  Apply a compressive  bandage. Rest the affected painful area.  Apply cold compresses intermittently as needed.  As pain recedes, begin normal activities slowly as tolerated.  Call if symptoms persist. Tylenol 1-2 tabs po q4h prn                   Follow Up   No follow-ups on file.  Patient was given instructions and counseling regarding her condition or for health maintenance advice. Please see specific information pulled into the AVS if appropriate.

## 2023-06-14 NOTE — PROGRESS NOTES
[facility]  [address] [place] [phone]  Phone   Fax       Alaina Arzate  [phone]   1995  27 y.o.  female      Referring Provider and PCP: Maria M Villeda MD    Type of service: Initial Sleep Medicine Consult.  Date of service: 6/15/2023          CHIEF COMPLAINT: Suspected sleep apnea      HISTORY OF PRESENT ILLNESS:  Thank you for asking us to see Alaina Arzate.  Alaina Arzate 27 y.o. was seen today on 6/15/2023 at [facility].  Patient presents today to look into possible sleep apnea.  She has been having some jaw cracking, teeth grinding, and morning headaches.  She saw her dentist and they wanted to rule out sleep apnea prior to considering dental appliance for grinding her teeth.  She does have family history of sleep apnea in her grandparents and she believes her father may have it as well but no formal diagnosis.  She has been told she snores in the past.  She is not sure if she snores currently.  She does wake up with morning headaches.  Even after getting 8 hours of sleep at night she wakes up tired, not restored.  She does feel that this could be medication related as well.  Her boyfriend is with her in the office today, per patient preference.  Denies sudden episodes of sleeping during the day.  Denies narcolepsy symptoms.      SLEEP HISTORY:  Sleep schedule:  Bedtime: 10 PM weeknights, 11 PM to midnight weekends  Wake time: 5:50 AM weekdays, 8-9 weekends  Normally takes about 10-15 minutes to fall asleep  Average hours of sleep: 8  Number of naps per day: 0    Symptoms:   In addition to the above, patient reports the following associated symptoms:  Have you ever awakened gasping for breath, coughing, choking: No   Change in weight:  No   Morning headaches:  Yes   Awaken with a sore throat or dry mouth:  No   Leg jerking at night:  No   Creepy crawly feeling in legs/urge to move legs: No   Teeth grinding: Yes   Have  "you ever awakened at night with a sour taste or burning sensation in your chest:  No   Do you have muscle weakness with laughing or anger:  No   Have you ever felt paralyzed while going to sleep or waking up:  No   Sleepwalking: No   Nightmares: No   Nocturia (urination at night): 0-1 times per night  Memory Problem: No     Medical Conditions (PMH):   Anxiety  Teeth grinding    Social history:  Do you drive a commercial vehicle:  No   Shift work:  No   Tobacco use:  No  Alcohol use: 0 per week  Caffeinated drinks: 3 per day  Occupation:  at [employer]    Family History (parents and siblings) (pertaining to sleep medicine):  Sleep apnea  Hypertension  Heart disease  COPD  Diabetes  Obesity  Cancer    Medications: reviewed    Allergies:  Patient has no known allergies.      REVIEW OF SYSTEMS:  Pertinent positive symptoms are:  Snoring  [place] Sleepiness Scale of Total score: 4   Morning fatigue  Headaches  Nocturnal bruxism  Anxiety      PHYSICAL EXAM:  CONSTITUTINONAL:   Vitals:    06/15/23 1300   BP: 97/66   BP Location: Left arm   Patient Position: Sitting   Cuff Size: Adult   Pulse: 78   Resp: 12   SpO2: 99%   Weight: 58.3 kg (128 lb 9.6 oz)   Height: 160 cm (63\")    Body mass index is 22.78 kg/m².   HEAD: atraumatic, normocephalic   NOSE: nasal passages are clear  THROAT: tonsils are nonenlarged, Mallampati class II-III and narrow  NECK: Neck Circumference: 12.75 inches, trachea is midline  RESPIRATORY SYSTEM: Respirations even, unlabored, normal rate  CARDIOVASULAR SYSTEM: Normal rate, no edema  NEUROLOGICAL SYSTEM: Alert and oriented x 3, normal gait  PSYCHIATRIC SYSTEM: Mood is normal/ appropriate     Office notes from care team reviewed. Office note dated/10/23, reviewed.            ASSESSMENT AND PLAN:   Suspected sleep apnea: patient's symptoms and physical examination are concerning for possible sleep apnea (G47.30).   I discussed the signs, symptoms, and pathophysiology of sleep apnea with this " patient.  I also discussed the potential complications of untreated sleep apnea including but not limited to resistant hypertension, insulin resistance, pulmonary hypertension, atrial fibrillation, stroke, nonrestorative sleep with hypersomnia which can increase risk for motor vehicle accidents, etc.   Different testing methods including home-based and lab based sleep studies were discussed with this patient.   Based on patient history and physical examination, the most appropriate study is sleep study.  The order for the sleep study is placed in [facility].  The test will be scheduled after prior authorization has been obtained through patient's insurance.  Treatment and management will be discussed in more detail with this patient after the test is completed.  All questions were answered to patient's satisfaction.   Snoring (R06.83): snoring is the sound created by turbulent airflow vibrating upper airway soft tissue.  I have also discussed factors affecting snoring including sleep deprivation, sleeping on the back and alcohol ingestion. To minimize snoring, patient is advised to have adequate sleep, sleep on their side, and avoid alcohol and sedative medications around bedtime.   Anxiety  Family history sleep apnea  Nocturnal bruxism: If sleep study does not show evidence of sleep apnea she will follow-up with her dentist for possible bite guard or further recommendations per dentist    Patient will follow-up after study, 31 to 90 days after PAP therapy initiated if applicable, or contact the office sooner for questions or concerns. Patient's questions were answered.            Thank you again for asking me to consult on this patient.  Please do not hesitate to call me if you have additional questions or concerns.       Juliana Cantu DNP, APRN  [facility]

## 2023-06-15 ENCOUNTER — OFFICE VISIT (OUTPATIENT)
Dept: SLEEP MEDICINE | Facility: CLINIC | Age: 28
End: 2023-06-15
Payer: COMMERCIAL

## 2023-06-15 VITALS
SYSTOLIC BLOOD PRESSURE: 97 MMHG | WEIGHT: 128.6 LBS | DIASTOLIC BLOOD PRESSURE: 66 MMHG | HEART RATE: 78 BPM | BODY MASS INDEX: 22.79 KG/M2 | HEIGHT: 63 IN | OXYGEN SATURATION: 99 % | RESPIRATION RATE: 12 BRPM

## 2023-06-15 DIAGNOSIS — Z82.0 FAMILY HISTORY OF SLEEP APNEA: ICD-10-CM

## 2023-06-15 DIAGNOSIS — G47.8 NON-RESTORATIVE SLEEP: Primary | ICD-10-CM

## 2023-06-15 DIAGNOSIS — G47.63 SLEEP-RELATED BRUXISM: ICD-10-CM

## 2023-06-15 DIAGNOSIS — R06.83 SNORING: ICD-10-CM

## 2023-06-18 NOTE — ASSESSMENT & PLAN NOTE
Apply a compressive  bandage. Rest the affected painful area.  Apply cold compresses intermittently as needed.  As pain recedes, begin normal activities slowly as tolerated.  Call if symptoms persist. Tylenol 1-2 tabs po q4h prn

## 2023-10-06 ENCOUNTER — HOSPITAL ENCOUNTER (OUTPATIENT)
Dept: GENERAL RADIOLOGY | Facility: HOSPITAL | Age: 28
Discharge: HOME | End: 2023-10-06
Admitting: FAMILY MEDICINE
Payer: COMMERCIAL

## 2023-10-06 DIAGNOSIS — R10.32 LLQ ABDOMINAL PAIN: Primary | ICD-10-CM

## 2023-10-06 DIAGNOSIS — R10.32 LLQ ABDOMINAL PAIN: ICD-10-CM

## 2023-10-06 PROCEDURE — 74018 RADEX ABDOMEN 1 VIEW: CPT

## 2023-11-10 RX ORDER — ESCITALOPRAM OXALATE 10 MG/1
10 TABLET ORAL DAILY
Qty: 90 TABLET | Refills: 1 | Status: SHIPPED | OUTPATIENT
Start: 2023-11-10

## 2024-01-03 RX ORDER — SUMATRIPTAN 50 MG/1
TABLET, FILM COATED ORAL
Qty: 9 TABLET | Refills: 3 | Status: SHIPPED | OUTPATIENT
Start: 2024-01-03

## 2024-03-26 RX ORDER — RIMEGEPANT SULFATE 75 MG/75MG
75 TABLET, ORALLY DISINTEGRATING ORAL
Qty: 4 TABLET | Refills: 0 | COMMUNITY
Start: 2024-03-26

## 2024-04-07 RX ORDER — RIMEGEPANT SULFATE 75 MG/75MG
75 TABLET, ORALLY DISINTEGRATING ORAL
Qty: 16 TABLET | Refills: 5 | Status: SHIPPED | OUTPATIENT
Start: 2024-04-07

## 2024-05-13 DIAGNOSIS — F41.9 ANXIETY: Primary | ICD-10-CM

## 2024-05-14 RX ORDER — DESVENLAFAXINE 25 MG/1
25 TABLET, EXTENDED RELEASE ORAL DAILY
Qty: 30 TABLET | Refills: 1 | Status: SHIPPED | OUTPATIENT
Start: 2024-05-14

## 2024-05-15 DIAGNOSIS — Z00.00 WELLNESS EXAMINATION: Primary | ICD-10-CM

## 2024-05-16 ENCOUNTER — LAB (OUTPATIENT)
Dept: FAMILY MEDICINE CLINIC | Facility: CLINIC | Age: 29
End: 2024-05-16
Payer: COMMERCIAL

## 2024-05-16 LAB
ALBUMIN SERPL-MCNC: 4.3 G/DL (ref 3.5–5.2)
ALBUMIN/GLOB SERPL: 1.6 G/DL
ALP SERPL-CCNC: 38 U/L (ref 39–117)
ALT SERPL W P-5'-P-CCNC: 53 U/L (ref 1–33)
ANION GAP SERPL CALCULATED.3IONS-SCNC: 9.7 MMOL/L (ref 5–15)
AST SERPL-CCNC: 26 U/L (ref 1–32)
BASOPHILS # BLD AUTO: 0.03 10*3/MM3 (ref 0–0.2)
BASOPHILS NFR BLD AUTO: 0.6 % (ref 0–1.5)
BILIRUB SERPL-MCNC: 0.3 MG/DL (ref 0–1.2)
BUN SERPL-MCNC: 8 MG/DL (ref 6–20)
BUN/CREAT SERPL: 11.8 (ref 7–25)
CALCIUM SPEC-SCNC: 9.5 MG/DL (ref 8.6–10.5)
CHLORIDE SERPL-SCNC: 102 MMOL/L (ref 98–107)
CHOLEST SERPL-MCNC: 183 MG/DL (ref 0–200)
CO2 SERPL-SCNC: 26.3 MMOL/L (ref 22–29)
CREAT SERPL-MCNC: 0.68 MG/DL (ref 0.57–1)
DEPRECATED RDW RBC AUTO: 38.8 FL (ref 37–54)
EGFRCR SERPLBLD CKD-EPI 2021: 123.4 ML/MIN/1.73
EOSINOPHIL # BLD AUTO: 0.14 10*3/MM3 (ref 0–0.4)
EOSINOPHIL NFR BLD AUTO: 2.6 % (ref 0.3–6.2)
ERYTHROCYTE [DISTWIDTH] IN BLOOD BY AUTOMATED COUNT: 11.5 % (ref 12.3–15.4)
GLOBULIN UR ELPH-MCNC: 2.7 GM/DL
GLUCOSE SERPL-MCNC: 95 MG/DL (ref 65–99)
HCT VFR BLD AUTO: 41.4 % (ref 34–46.6)
HDLC SERPL-MCNC: 80 MG/DL (ref 40–60)
HGB BLD-MCNC: 14 G/DL (ref 12–15.9)
IMM GRANULOCYTES # BLD AUTO: 0.01 10*3/MM3 (ref 0–0.05)
IMM GRANULOCYTES NFR BLD AUTO: 0.2 % (ref 0–0.5)
LDLC SERPL CALC-MCNC: 85 MG/DL (ref 0–100)
LDLC/HDLC SERPL: 1.04 {RATIO}
LYMPHOCYTES # BLD AUTO: 1.42 10*3/MM3 (ref 0.7–3.1)
LYMPHOCYTES NFR BLD AUTO: 26.2 % (ref 19.6–45.3)
MCH RBC QN AUTO: 31.7 PG (ref 26.6–33)
MCHC RBC AUTO-ENTMCNC: 33.8 G/DL (ref 31.5–35.7)
MCV RBC AUTO: 93.7 FL (ref 79–97)
MONOCYTES # BLD AUTO: 0.41 10*3/MM3 (ref 0.1–0.9)
MONOCYTES NFR BLD AUTO: 7.6 % (ref 5–12)
NEUTROPHILS NFR BLD AUTO: 3.4 10*3/MM3 (ref 1.7–7)
NEUTROPHILS NFR BLD AUTO: 62.8 % (ref 42.7–76)
NRBC BLD AUTO-RTO: 0 /100 WBC (ref 0–0.2)
PLATELET # BLD AUTO: 273 10*3/MM3 (ref 140–450)
PMV BLD AUTO: 11.8 FL (ref 6–12)
POTASSIUM SERPL-SCNC: 4.1 MMOL/L (ref 3.5–5.2)
PROT SERPL-MCNC: 7 G/DL (ref 6–8.5)
RBC # BLD AUTO: 4.42 10*6/MM3 (ref 3.77–5.28)
SODIUM SERPL-SCNC: 138 MMOL/L (ref 136–145)
TRIGL SERPL-MCNC: 99 MG/DL (ref 0–150)
TSH SERPL DL<=0.05 MIU/L-ACNC: 0.96 UIU/ML (ref 0.27–4.2)
VLDLC SERPL-MCNC: 18 MG/DL (ref 5–40)
WBC NRBC COR # BLD AUTO: 5.41 10*3/MM3 (ref 3.4–10.8)

## 2024-05-16 PROCEDURE — 80050 GENERAL HEALTH PANEL: CPT | Performed by: FAMILY MEDICINE

## 2024-05-16 PROCEDURE — 36415 COLL VENOUS BLD VENIPUNCTURE: CPT | Performed by: FAMILY MEDICINE

## 2024-05-16 PROCEDURE — 80061 LIPID PANEL: CPT | Performed by: FAMILY MEDICINE

## 2024-05-24 RX ORDER — ESCITALOPRAM OXALATE 10 MG/1
10 TABLET ORAL DAILY
Qty: 90 TABLET | Refills: 1 | OUTPATIENT
Start: 2024-05-24

## 2024-06-04 RX ORDER — RIMEGEPANT SULFATE 75 MG/75MG
75 TABLET, ORALLY DISINTEGRATING ORAL
Qty: 16 TABLET | Refills: 5 | Status: SHIPPED | OUTPATIENT
Start: 2024-06-04

## 2024-06-10 ENCOUNTER — TELEPHONE (OUTPATIENT)
Dept: FAMILY MEDICINE CLINIC | Facility: CLINIC | Age: 29
End: 2024-06-10
Payer: COMMERCIAL

## 2024-06-10 NOTE — TELEPHONE ENCOUNTER
Her PA for the [facility] was denied because pt needs to try and fail Almotriptan, eletriptan or frovatriptan or any other drug in that class.     I spoke with the appeals department. They have started an appeal, I informed them that she has tried and failed Sumatriptan.     I told informed to fax all notes I have to [phone]     I will print out all notes and past medications used and failed.

## 2024-06-17 ENCOUNTER — TELEPHONE (OUTPATIENT)
Dept: FAMILY MEDICINE CLINIC | Facility: CLINIC | Age: 29
End: 2024-06-17
Payer: COMMERCIAL

## 2024-06-19 RX ORDER — RIZATRIPTAN BENZOATE 10 MG/1
10 TABLET, ORALLY DISINTEGRATING ORAL ONCE AS NEEDED
Qty: 9 TABLET | Refills: 5 | Status: SHIPPED | OUTPATIENT
Start: 2024-06-19

## 2024-06-24 RX ORDER — RIMEGEPANT SULFATE 75 MG/75MG
75 TABLET, ORALLY DISINTEGRATING ORAL
Qty: 8 TABLET | Refills: 5 | Status: SHIPPED | OUTPATIENT
Start: 2024-06-24

## 2024-07-06 RX ORDER — DESVENLAFAXINE 25 MG/1
25 TABLET, EXTENDED RELEASE ORAL DAILY
Qty: 30 TABLET | Refills: 1 | Status: SHIPPED | OUTPATIENT
Start: 2024-07-06

## 2024-08-23 RX ORDER — DESVENLAFAXINE 25 MG/1
50 TABLET, EXTENDED RELEASE ORAL DAILY
Qty: 60 TABLET | Refills: 1 | Status: SHIPPED | OUTPATIENT
Start: 2024-08-23

## 2024-08-28 RX ORDER — DESVENLAFAXINE 50 MG/1
50 TABLET, FILM COATED, EXTENDED RELEASE ORAL DAILY
Qty: 90 TABLET | Refills: 1 | Status: SHIPPED | OUTPATIENT
Start: 2024-08-28

## 2024-10-07 DIAGNOSIS — G43.009 MIGRAINE WITHOUT AURA AND WITHOUT STATUS MIGRAINOSUS, NOT INTRACTABLE: Primary | ICD-10-CM

## 2024-10-09 ENCOUNTER — TELEPHONE (OUTPATIENT)
Dept: FAMILY MEDICINE CLINIC | Facility: CLINIC | Age: 29
End: 2024-10-09
Payer: COMMERCIAL

## 2024-10-09 PROBLEM — G43.709 CHRONIC MIGRAINE WITHOUT AURA WITHOUT STATUS MIGRAINOSUS, NOT INTRACTABLE: Status: ACTIVE | Noted: 2024-10-09

## 2024-10-09 NOTE — TELEPHONE ENCOUNTER
Yes, please call her and let her know that the neurologist won't see her until she comes in to see me and I document all of her headache symptoms and what things she has tried.

## 2024-10-09 NOTE — TELEPHONE ENCOUNTER
PATIENT HAS A NEUROLOGY REFERRAL THAT CAME BACK WITH THIS MESSAGE:    ROUTE BACK ADVISE WE NEED DX DOCUMENTATION ONLY HAVE ORDERS TY

## 2024-10-18 ENCOUNTER — OFFICE VISIT (OUTPATIENT)
Dept: FAMILY MEDICINE CLINIC | Facility: CLINIC | Age: 29
End: 2024-10-18
Payer: COMMERCIAL

## 2024-10-18 VITALS
HEIGHT: 63 IN | BODY MASS INDEX: 24.31 KG/M2 | HEART RATE: 73 BPM | WEIGHT: 137.2 LBS | DIASTOLIC BLOOD PRESSURE: 76 MMHG | SYSTOLIC BLOOD PRESSURE: 111 MMHG | TEMPERATURE: 98.7 F | OXYGEN SATURATION: 98 %

## 2024-10-18 DIAGNOSIS — F33.1 MODERATE EPISODE OF RECURRENT MAJOR DEPRESSIVE DISORDER: ICD-10-CM

## 2024-10-18 DIAGNOSIS — F32.81 PMDD (PREMENSTRUAL DYSPHORIC DISORDER): ICD-10-CM

## 2024-10-18 DIAGNOSIS — G43.709 CHRONIC MIGRAINE WITHOUT AURA WITHOUT STATUS MIGRAINOSUS, NOT INTRACTABLE: Primary | ICD-10-CM

## 2024-10-18 PROCEDURE — 99214 OFFICE O/P EST MOD 30 MIN: CPT | Performed by: FAMILY MEDICINE

## 2024-10-18 RX ORDER — RIMEGEPANT SULFATE 75 MG/75MG
75 TABLET, ORALLY DISINTEGRATING ORAL
Qty: 16 TABLET | Refills: 5 | Status: SHIPPED | OUTPATIENT
Start: 2024-10-18

## 2024-10-18 NOTE — PROGRESS NOTES
"Chief Complaint  Migraine    Subjective        Alaina Arzate presents to [facility]  Migraine  Headache pattern:  Some headache always there, and the pain level varies  Duration:  1 to 2 years  Frequency:  Headaches came infrequently then constant pain started  Initial event:  Stressful life event  Recent event:  Stressful life event  Lifetime total:  20+  Number of ER visits for headache:  0  Time of day symptoms are worse:  No specific time of day  Days of the week symptoms are worse:  No specific day of the week  Quality:  Pounding and squeezing  Location:  Temples/sides  Aggravating factors:  Stress  Changes in thinking and mood:  Not feeling right, irritability and mood changes  Changes in vision:  None  Stomach/GI changes:  Nausea  Changes in sensation:  Sensitivity to light  Abortive medications tried:  Acetaminophen, sumatriptan, rizatriptan and ibuprofen  Preventative medications tried:  Ubrogepant  Depression  Presents for follow-up visit. Symptoms include decreased concentration, depressed mood, excessive worry, feelings of hopelessness, irritability, nervousness/anxiety, thoughts of death and difficulty controlling mood. Symptoms occur constantly.  The severity of symptoms is causing significant distress.  The quality of sleep is fair. Her past medical history is significant for depression. Past treatments include non-SSRI antidepressants and SSRI. The treatment provides mild relief. Compliance with medications is %.   Additional comments: She  has been trying to get in to see a therapist but she has not been able to find one who can accommodate her work schedule.  She is working 5-6 days per week at [employer].  She is also concerned about the cost of the therapy.        Objective   Vital Signs:  /76 (BP Location: Right arm, Patient Position: Sitting, Cuff Size: Adult)   Pulse 73   Temp 98.7 °F (37.1 °C) (Infrared)   Ht 160 cm (63\")   Wt 62.2 kg (137 lb " "3.2 oz)   SpO2 98%   BMI 24.30 kg/m²   Estimated body mass index is 24.3 kg/m² as calculated from the following:    Height as of this encounter: 160 cm (63\").    Weight as of this encounter: 62.2 kg (137 lb 3.2 oz).    BMI is within normal parameters. No other follow-up for BMI required.      Physical Exam  Vitals and nursing note reviewed. Exam conducted with a chaperone present.   Constitutional:       General: She is not in acute distress.     Appearance: She is well-developed.   HENT:      Head: Normocephalic.   Eyes:      General: Lids are normal.      Conjunctiva/sclera: Conjunctivae normal.   Neck:      Thyroid: No thyroid mass or thyromegaly.      Trachea: Trachea normal.   Cardiovascular:      Rate and Rhythm: Normal rate and regular rhythm.      Heart sounds: Normal heart sounds.   Pulmonary:      Effort: Pulmonary effort is normal.      Breath sounds: Normal breath sounds.   Musculoskeletal:      Cervical back: Normal range of motion.   Lymphadenopathy:      Cervical: No cervical adenopathy.   Skin:     General: Skin is warm and dry.   Neurological:      Mental Status: She is alert and oriented to person, place, and time.   Psychiatric:         Attention and Perception: She is attentive.         Mood and Affect: Mood is depressed. Affect is tearful.         Speech: Speech normal.         Behavior: Behavior is cooperative.         Cognition and Memory: Cognition normal.        Result Review :  The following data was reviewed by: Maria M Villeda MD on 10/18/2024:  Common labs          5/16/2024    08:55   Common Labs   Glucose 95    BUN 8    Creatinine 0.68    Sodium 138    Potassium 4.1    Chloride 102    Calcium 9.5    Albumin 4.3    Total Bilirubin 0.3    Alkaline Phosphatase 38    AST (SGOT) 26    ALT (SGPT) 53    WBC 5.41    Hemoglobin 14.0    Hematocrit 41.4    Platelets 273    Total Cholesterol 183    Triglycerides 99    HDL Cholesterol 80    LDL Cholesterol  85                Assessment and Plan "   Diagnoses and all orders for this visit:    1. Chronic migraine without aura without status migrainosus, not intractable (Primary)  -     Rimegepant Sulfate (Nurtec) 75 MG tablet dispersible tablet; Take 1 tablet by mouth Every Other Day. Taking as a preventive medicine  Dispense: 16 tablet; Refill: 5    2. Moderate episode of recurrent major depressive disorder  -     Vortioxetine HBr (Trintellix) 5 MG tablet tablet; Take 1 tablet by mouth Daily With Breakfast.  Dispense: 30 tablet; Refill: 1    3. PMDD (premenstrual dysphoric disorder)             Follow Up   No follow-ups on file.  Patient was given instructions and counseling regarding her condition or for health maintenance advice. Please see specific information pulled into the AVS if appropriate.

## 2024-10-29 RX ORDER — DULOXETIN HYDROCHLORIDE 30 MG/1
30 CAPSULE, DELAYED RELEASE ORAL DAILY
Qty: 90 CAPSULE | Refills: 1 | Status: SHIPPED | OUTPATIENT
Start: 2024-10-29

## 2024-12-23 DIAGNOSIS — G43.709 CHRONIC MIGRAINE WITHOUT AURA WITHOUT STATUS MIGRAINOSUS, NOT INTRACTABLE: ICD-10-CM

## 2024-12-23 RX ORDER — RIMEGEPANT SULFATE 75 MG/75MG
TABLET, ORALLY DISINTEGRATING ORAL
Qty: 8 TABLET | Refills: 10 | Status: SHIPPED | OUTPATIENT
Start: 2024-12-23

## 2025-02-20 NOTE — PROGRESS NOTES
Chief Complaint  Migraine    Subjective            Alaina Arzate presents to [facility] for migraine  History of Present Illness  Alaina Arzate is a 29-year-old female seen today as a new patient referred by Dr. Villeda for migraine headache.  The patient started having migraines around 17-18 years old.  She denies having any visual aura prior to her migraines.  She describes the pain as unilateral usually behind the eye, into the side of the head, and down the neck.  She describes the pain as throbbing.  She denies significant light sensitivity, but does have some sound sensitivity she denies nausea or vomiting.  Sometimes migraines last 3 days if and effectively treated.  Migraines can be triggered by hormonal fluctuations or eating sugary snacks at night.  The patient was started on Nurtec every other day and her migraines reduced from 3 times a week to once a week.  Unfortunately she is having migraine on her off days.  She has tried both sumatriptan hand and rizatriptan which both caused side effects including a sensation like her throat was closing.  She has tried ibuprofen and Ubrelvy without benefit.  Patient has difficulty swallowing pills.    She has never had brain imaging.  The patient does have anxiety and depression, currently treated.  She drinks around 1 bottle (~16 ounces) of water a day and then caffeinated beverages the rest of the day.  She often has trouble falling asleep but typically sleeps from 11 PM to 6 AM on  workdays and 11 PM to 8 AM on days off.  Patient reports that she is tired all the time, but seems to have trouble falling asleep at night.  She has had a sleep study which was unremarkable.  She does not have a family history of migraine.    Patient mentions having vaginal occasional dizziness and lightheadedness when going from a sitting or crouching position to a standing position.        Migraine  Headache pattern:  Some headache always there, and the  pain level varies  Duration:  1 to 2 years  Frequency:  Headaches came infrequently then constant pain started  Initial event:  Stressful life event  Recent event:  Stressful life event  Lifetime total:  20+  Number of ER visits for headache:  0  Time of day symptoms are worse:  No specific time of day  Days of the week symptoms are worse:  No specific day of the week  Quality:  Pounding and squeezing  Location:  [place]/sides  Aggravating factors:  Stress  Changes in thinking and mood:  Not feeling right, irritability and mood changes  Changes in vision:  None  Stomach/GI changes: None  Changes in sensation:  Sensitivity to light  Abortive medications tried:  Acetaminophen, sumatriptan, rizatriptan and ibuprofen, Ubrelvy  Preventative medications tried: Nurtec every other day    Family History   Problem Relation Age of Onset    Diabetes Maternal Grandmother     Sleep apnea Maternal Grandfather        Past Medical History:   Diagnosis Date    Anxiety        Social History     Socioeconomic History    Marital status: Single   Tobacco Use    Smoking status: Never     Passive exposure: Past    Smokeless tobacco: Never   Vaping Use    Vaping status: Never Used   Substance and Sexual Activity    Alcohol use: No    Drug use: No    Sexual activity: Defer         Current Outpatient Medications:     desvenlafaxine (Pristiq) 50 MG 24 hr tablet, Take 1 tablet by mouth Daily., Disp: 90 tablet, Rfl: 1    rimegepant sulfate (Nurtec) 75 MG tablet, DISSOLVE 1 TABLET ON THE TONGUE EVERY OTHER DAY AS NEEDED FOR MIGRAINE HEADACHE, Disp: 8 tablet, Rfl: 10    Valarie 3-0.03 MG per tablet, Take 1 tablet by mouth Daily., Disp: , Rfl:     baclofen (LIORESAL) 10 MG tablet, Take 1 tablet by mouth 3 (Three) Times a Day As Needed for Muscle Spasms. (Patient not taking: Reported on 2/21/2025), Disp: 30 tablet, Rfl: 0    DULoxetine (Cymbalta) 30 MG capsule, Take 1 capsule by mouth Daily. (Patient not taking: Reported on 2/21/2025), Disp: 90  "capsule, Rfl: 1    Fremanezumab-vfrm 225 MG/1.5ML solution auto-injector, Inject 225 mg under the skin into the appropriate area as directed Every 30 (Thirty) Days., Disp: 1.68 mL, Rfl: 5    Review of Systems   Constitutional:  Positive for fatigue.   Neurological:  Positive for dizziness, light-headedness and headaches.   Psychiatric/Behavioral:  The patient is nervous/anxious.    All other systems reviewed and are negative.           Objective   Vital Signs:   /82   Pulse 101   Ht 160 cm (63\")   Wt 62.1 kg (137 lb)   BMI 24.27 kg/m²     Physical Exam  Vitals reviewed.   Constitutional:       Appearance: She is well-developed.   HENT:      Head: Normocephalic and atraumatic.   Eyes:      General: Lids are normal.      Extraocular Movements: Extraocular movements intact.      Pupils: Pupils are equal, round, and reactive to light.   Neck:      Vascular: No carotid bruit.   Cardiovascular:      Rate and Rhythm: Normal rate.      Heart sounds: No murmur heard.  Pulmonary:      Effort: Pulmonary effort is normal.   Musculoskeletal:      Cervical back: Normal range of motion and neck supple.   Skin:     General: Skin is warm and dry.   Neurological:      Mental Status: She is alert and oriented to person, place, and time.      Cranial Nerves: Cranial nerves 2-12 are intact. No cranial nerve deficit.      Sensory: No sensory deficit.      Motor: Motor function is intact. No weakness or tremor.      Coordination: Coordination is intact. Romberg sign negative. Finger-Nose-Finger Test and Heel to Shin Test normal. Rapid alternating movements normal.      Gait: Gait normal.      Deep Tendon Reflexes:      Reflex Scores:       Tricep reflexes are 1+ on the right side and 1+ on the left side.       Bicep reflexes are 1+ on the right side and 1+ on the left side.       Brachioradialis reflexes are 1+ on the right side and 1+ on the left side.       Patellar reflexes are 1+ on the right side and 1+ on the left " side.  Psychiatric:         Attention and Perception: Attention normal.         Mood and Affect: Mood is anxious.         Speech: Speech normal.         Behavior: Behavior normal.         Cognition and Memory: Cognition and memory normal.         Judgment: Judgment normal.        Result Review :   The following data was reviewed by: LUTHER Patel on 02/21/2025:  CMP          5/16/2024    08:55   CMP   Glucose 95    BUN 8    Creatinine 0.68    EGFR 123.4    Sodium 138    Potassium 4.1    Chloride 102    Calcium 9.5    Total Protein 7.0    Albumin 4.3    Globulin 2.7    Total Bilirubin 0.3    Alkaline Phosphatase 38    AST (SGOT) 26    ALT (SGPT) 53    Albumin/Globulin Ratio 1.6    BUN/Creatinine Ratio 11.8    Anion Gap 9.7      CBC          5/16/2024    08:55   CBC   WBC 5.41    RBC 4.42    Hemoglobin 14.0    Hematocrit 41.4    MCV 93.7    MCH 31.7    MCHC 33.8    RDW 11.5    Platelets 273      TSH          5/16/2024    08:55   TSH   TSH 0.963               Neurological Exam  Mental Status  Alert. Oriented to person, place and time. Oriented to person, place, and time. Memory is normal. Recent and remote memory are intact. Speech is normal. Language is fluent with no aphasia. Attention and concentration are normal. Fund of knowledge is appropriate for level of education.    Cranial Nerves  CN II: Visual acuity is normal. Visual fields full to confrontation.  CN III, IV, VI: Extraocular movements intact bilaterally. Normal lids and orbits bilaterally. Pupils equal round and reactive to light bilaterally.  CN V: Facial sensation is normal.  CN VII: Full and symmetric facial movement.  CN IX, X: Palate elevates symmetrically. Normal gag reflex.  CN XI: Shoulder shrug strength is normal.  CN XII: Tongue midline without atrophy or fasciculations.    Motor   No abnormal involuntary movements. No pronator drift.                                             Right                     Left   Shoulder abduction                5                          5  Elbow flexion                         5                          5  Elbow extension                    5                          5  Hip flexion                              5                          5    Sensory  Light touch is normal in upper and lower extremities. Vibration is normal in upper and lower extremities.     Reflexes                                            Right                      Left  Brachioradialis                    1+                         1+  Biceps                                 1+                         1+  Triceps                                1+                         1+  Patellar                                1+                         1+    Coordination    Finger-to-nose, rapid alternating movements and heel-to-shin normal bilaterally without dysmetria.No tremor    Gait   Normal gait.Casual gait is normal including stance, stride, and arm swing. Romberg is absent. Able to rise from chair without using arms.              Assessment and Plan    Diagnoses and all orders for this visit:    1. Intractable chronic migraine without aura and without status migrainosus (Primary)  -     MRI Brain With & Without Contrast; Future  -     Fremanezumab-vfrm 225 MG/1.5ML solution auto-injector; Inject 225 mg under the skin into the appropriate area as directed Every 30 (Thirty) Days.  Dispense: 1.68 mL; Refill: 5      Alaina Arzate is seen today as a new patient for migraine headaches.  The patient has had migraines since her teens.  She was originally having migraines approximately 3 times a week but has been taking Nurtec every other day since last summer and now has migraine once a week.  Unfortunately, migraines occur on her  days off Nurtec and she has been unable to use Maxalt or sumatriptan due to side effects.  She has tried Ubrelvy but has trouble swallowing pills and gags easily.    I would like to switch her to a monthly injectable.  She has  episodes of orthostatic hypotension and said like to avoid medication like propranolol.  Also will avoid topiramate since she is on Pristiq and this can contribute to serotonin when taken in conjunction.  Will order [place] to [facility] pharmacy.      I would like for her to continue Nurtec 75 mg as needed for acute migraine.  She can also take Aleve or Tylenol as needed.  She should avoid ibuprofen Excedrin Migraine more than 3 times a week as it can cause rebound/medication overuse headache.    We discussed lifestyle modifications including increasing her water intake and reducing her caffeine intake.  Reducing caffeine may help regulate her sleep better.  I would like for her to start an exercise routine.  This may also improve her generalized fatigue that she has during the day.  I also encouraged her to try to go to bed sooner and get at least 8 hours of sleep each night.  I would also like the patient to do a migraine diary to see if there is any other triggers besides sugar and hormonal fluctuations.    Will order MRI brain with and without contrast to get a baseline.  The patient expresses concern about possible brain tumor.    Dizziness sounds like orthostatic hypotension.  I encouraged her to increase her water intake to  ounces/day.    She will follow-up in 3 to 4 months, but is encouraged to contact the office in the meantime with questions or concerns.      I spent 60 minutes caring for Alaina on this date of service. This time includes time spent by me in the following activities:preparing for the visit, reviewing tests, performing a medically appropriate examination and/or evaluation , counseling and educating the patient/family/caregiver, ordering medications, tests, or procedures, and documenting information in the medical record  Follow Up   No follow-ups on file.  Patient was given instructions and counseling regarding her condition or for health maintenance advice. Please see specific  information pulled into the AVS if appropriate.         This document has been electronically signed by LUTHER Canela on February 21, 2025 13:15 EST

## 2025-02-21 ENCOUNTER — OFFICE VISIT (OUTPATIENT)
Dept: NEUROLOGY | Facility: CLINIC | Age: 30
End: 2025-02-21
Payer: COMMERCIAL

## 2025-02-21 VITALS
BODY MASS INDEX: 24.27 KG/M2 | DIASTOLIC BLOOD PRESSURE: 82 MMHG | HEART RATE: 101 BPM | WEIGHT: 137 LBS | HEIGHT: 63 IN | SYSTOLIC BLOOD PRESSURE: 125 MMHG

## 2025-02-21 DIAGNOSIS — F41.9 ANXIETY: ICD-10-CM

## 2025-02-21 DIAGNOSIS — G43.719 INTRACTABLE CHRONIC MIGRAINE WITHOUT AURA AND WITHOUT STATUS MIGRAINOSUS: Primary | ICD-10-CM

## 2025-02-21 DIAGNOSIS — I95.1 ORTHOSTATIC HYPOTENSION: ICD-10-CM

## 2025-02-21 RX ORDER — BUPROPION HYDROCHLORIDE 150 MG/1
150 TABLET ORAL EVERY MORNING
Qty: 90 TABLET | Refills: 1 | Status: SHIPPED | OUTPATIENT
Start: 2025-02-21

## 2025-02-25 ENCOUNTER — SPECIALTY PHARMACY (OUTPATIENT)
Dept: INFUSION THERAPY | Facility: HOSPITAL | Age: 30
End: 2025-02-25
Payer: COMMERCIAL

## 2025-02-25 NOTE — PROGRESS NOTES
Specialty Pharmacy Patient Management Program  Neurology Initial Assessment     Alaina Arzate is a 29 y.o. female with migraines seen by a Neurology provider and enrolled in the Neurology Patient Management program offered by [facility].  An initial outreach was conducted, including assessment of therapy appropriateness and specialty medication education for Ajovy 225mg and Nurtec 75mg. The patient was introduced to services offered by [facility], including: regular assessments, refill coordination, curbside pick-up or mail order delivery options, prior authorization maintenance, and financial assistance programs as applicable. The patient was also provided with contact information for the pharmacy team.     Insurance Coverage & Financial Support  [facility]     Relevant Past Medical History and Comorbidities  Relevant medical history and concomitant health conditions were discussed with the patient. The patient's chart has been reviewed for relevant past medical history and comorbid health conditions and updated as necessary.   Past Medical History:   Diagnosis Date    Anxiety      Social History     Socioeconomic History    Marital status: Single   Tobacco Use    Smoking status: Never     Passive exposure: Past    Smokeless tobacco: Never   Vaping Use    Vaping status: Never Used   Substance and Sexual Activity    Alcohol use: No    Drug use: No    Sexual activity: Defer     Problem list reviewed by Gregor Rodriguez RPH on 2/25/2025 at  1:42 PM    Allergies  Known allergies and reactions were discussed with the patient. The patient's chart has been reviewed for  allergy information and updated as necessary.   No Known Allergies  Allergies reviewed by Gregor Rodriguez RPH on 2/25/2025 at  1:42 PM    Relevant Laboratory Values  Common labs          5/16/2024    08:55   Common Labs   Glucose 95    BUN 8    Creatinine 0.68    Sodium 138     Potassium 4.1    Chloride 102    Calcium 9.5    Albumin 4.3    Total Bilirubin 0.3    Alkaline Phosphatase 38    AST (SGOT) 26    ALT (SGPT) 53    WBC 5.41    Hemoglobin 14.0    Hematocrit 41.4    Platelets 273    Total Cholesterol 183    Triglycerides 99    HDL Cholesterol 80    LDL Cholesterol  85        Lab Assessment  There are no recent or up to date laboratory values in Epic to assess. There are no required ongoing laboratory monitoring parameters for this drug. The patient has been encouraged to obtain a baseline set of labs for the 2025 calendar year for their own health records.   Current Medication List  This medication list has been reviewed with the patient and evaluated for any interactions or necessary modifications/recommendations, and updated to include all prescription medications, OTC medications, and supplements the patient is currently taking.  This list reflects what is contained in the patient's profile, which has also been marked as reviewed to communicate to other providers it is the most up to date version of the patient's current medication therapy.     Current Outpatient Medications:     buPROPion XL (Wellbutrin XL) 150 MG 24 hr tablet, Take 1 tablet by mouth Every Morning., Disp: 90 tablet, Rfl: 1    Fremanezumab-vfrm 225 MG/1.5ML solution auto-injector, Inject 225 mg under the skin into the appropriate area as directed Every 30 (Thirty) Days., Disp: 1.68 mL, Rfl: 5    rimegepant sulfate ODT (Nurtec) 75 MG disintegrating tablet, Take 75 mg ODT as needed at the onset of acute migraine.  Max dose 75 mg / 24-hour, Disp: 16 tablet, Rfl: 5    Valarie 3-0.03 MG per tablet, Take 1 tablet by mouth Daily., Disp: , Rfl:     Medicines reviewed by Gregor Rodriguez [facility] on 2/25/2025 at  1:42 PM    Drug Interactions  The patient's medication profile has been reviewed for accuracy and assessed for interactions. There are currently no significant interactions to address at this time, however,  the patient has been encouraged to notify our office if they plan to start any new medications or supplements.      Initial Education Provided for Specialty Medication  The patient has been provided with the following education and any applicable administration techniques (i.e. self-injection) have been demonstrated for the therapies indicated. All questions and concerns have been addressed prior to the patient receiving the medication, and the patient has verbalized understanding of the education and any materials provided.  Additional patient education shall be provided and documented upon request by the patient, provider or payer.      Ajovy (fremanezumab-[facility]) 225 mg subcutaneous every 30 days     Medication Expectations   Why am I taking this medication? You are taking this medication for migraine prophylaxis.   What should I expect while on this medication? You should expect to a decrease in the frequency and severity of your migraines.   How does the medication work? Ajovy is a monoclonal antibody that binds to calcitonin gene-related peptide (CGRP) and blocks its binding to the receptor decreasing the severity of migraines.   How long will I be on this medication for? The amount of time you will be on this medication will be determined by your doctor and your response to the medication.    How do I take this medication? Take as directed on your prescription label. This medication is a self-injection given every 30 days.    What are some possible side effects? Injection site reactions and hypersensitivity reactions.   What happens if I miss a dose? If you miss a dose, take it as soon as you remember, and time next dose 30 days from last dose.       Medication Safety   What are things I should warn my doctor immediately about? Cases of anaphylaxis and angioedema have been reported in the postmarketing setting. If a reaction occurs, notify your doctor immediately.   What are things that I should be cautious of?  Injection site reaction and hypersensitivity reactions, including rash, pruritus, drug hypersensitivity, and urticaria   What are some medications that can interact with this one? No drug interactions identified.      Medication Storage/Handling   How should I handle this medication? Keep this medication our of reach of pets/children in original container.  On the day your Ajovy is due let it set at room temperature for 30 minutes prior to injection. (do NOT warm using a heat source such as hot water or a microwave).  Administer in the abdomen, thigh, back of the upper arm, or buttocks.  Do not inject where the skin is tender, bruised, red or hard.  Rotate injection sites.   How does this medication need to be stored? Store in refrigerator and keep dry.   How should I dispose of this medication? You can dispose of the empty syringe in a sharps container, and if this is not available you may use an empty hard plastic container such as a milk jug or tide container.      Resources/Support   How can I remind myself to take this medication? You can download a reminder mendel on your phone or use a calandar  to help with your monthly injection.   Is financial support available?  Yes, [employer] can provide co-pay cards if you have commercial insurance or patient assistance if you have Medicare or no insurance.    Which vaccines are recommended for me? Talk to your doctor about these vaccines: Flu, Coronavirus (COVID-19), Pneumococcal (pneumonia), Tdap, Hepatitis B, Zoster (shingles)             Nurtec (rimegepant) 75 mg ODT, 1 tablet by mouth daily as needed for headache symptoms. Max 1 tablet over 24 hours.  Medication Expectations   Why am I taking this medication? You are taking this medication to treat an acute migraine.   What should I expect while on this medication? You should expect to see a decrease in the frequency and severity of your migraines.   How does the medication work? Nurtec is a small molecule  that binds to calcitonin gene-related peptide (CGRP) and blocks its binding to the receptor decreasing the severity of migraines.   How long will I be on this medication for? The amount of time you will be on this medication will be determined by your doctor and your response to the medication.    How do I take this medication? Take as directed on your prescription label.   What are some possible side effects? Potential side effects including, but not limited to nausea. Patient verbalized understanding.   What happens if I miss a dose? Take as soon as needed for headache or migraine symptoms.     Medication Safety   What are things I should warn my doctor immediately about? Hypersensitivity reactions - trouble breathing or swallowing.   What are things that I should be cautious of? Hypersensitivity reactions (eg, dyspnea, rash), including delayed serious reactions, have occurred; discontinue use if suspected    What are some medications that can interact with this one? Avoid concomitant administration of Nurtec ODT with strong inhibitors of CY, strong or moderate inducers of CYP3A or inhibitors of P-gp or BCRP. Avoid another dose of Nurtec ODT within 48 hours when it is administered with moderate inhibitors of CY. Ask your pharmacist or health care provider before starting new medications     Medication Storage/Handling   How should I handle this medication? Keep this medication out of reach of pets/children in original container. Ensure hands are dry before opening blister pack.   How does this medication need to be stored? Store at room temperature away from heat/cold, sunlight or moisture   How should I dispose of this medication? There should not be a need to dispose of this medication unless your provider decides to change the dose or therapy. If that is the case, take to your local police station for proper disposal. Some pharmacies also have take-back bins for medication drop-off.       Resources/Support   How can I remind myself to take this medication? You can download reminder apps to help you manage your refills. You may also set an alarm on your phone to remind you. The pharmacy carries pill boxes that you can place next to an area you pass everyday (such as where you place your car keys or where you charge your phone)   Is financial support available?  Yes, [employer] can provide co-pay cards if you have commercial insurance or patient assistance if you have Medicare or no insurance.    Which vaccines are recommended for me? Talk to your doctor about these vaccines: Flu, Coronavirus (COVID-19), Pneumococcal (pneumonia), Tdap, Hepatitis B, Zoster (shingles)      Adherence and Self-Administration  Adherence related to the patient's specialty therapy was discussed with the patient. The Adherence segment of this outreach has been reviewed and updated.   Is there a concern with patient's ability to self administer the medication correctly and without issue?: No  Were any potential barriers to adherence identified during the initial assessment or patient education?: No  Are there any concerns regarding the patient's understanding of the importance of medication adherence?: No  Methods for Supporting Patient Adherence and/or Self-Administration: The patient was provided a step by step overview of the correct administration procedure for Ajovy and I recommended she visit the Ajovy website for a good visual demonstration on how to inject the device. She is familiar with her prodrome symptoms and is able to take her Nurtec in a timely manner for most migraine episodes.     Goals of Therapy  Goals related to the patient's specialty therapy were discussed with the patient. The Patient Goals segment of this outreach has been reviewed and updated.   Goals Addressed Today        Specialty Pharmacy General Goal      2/25/25 - The patient is set to start Ajovy for migraine prophylaxis and is  continuing Nurtec as needed. She is currently experiencing roughly one migraine day per week with an average symptom score of 7-8/10.  She is hopeful that Ajovy will reduce her migraine days to no more than twice per month while also reducing her average symptom score to 5-6/10.  She continues to see good response with Nurtec and typically sees a % reduction in symptoms within 1-2 hours after administration for most episodes.                Reassessment Plan & Follow-Up  Medication Therapy Changes: Per Tracey Camacho, the patient is to initiate Ajovy 225mg once monthly for migraine prevention and is to continue Nurtec 75mg once as needed for migraine symptoms with a max daily dose of 75mg.   Related Plans, Therapy Recommendations, or Therapy Problems to Be Addressed: I advised the patient that although she may see slight improvements within the first month of Ajovy therapy, it would most likely be month three and four when she starts to see a significant reduction in both migraine severity and frequency.  There are no reportable adverse events related to Nurtec and she is very pleased with her abortive regimen at this time.   Pharmacist to perform regular reassessments no more than (6) months from the previous assessment.  Care Coordinator to set up future refill outreaches, coordinate prescription delivery, and escalate clinical questions to pharmacist.   Welcome information and patient satisfaction survey to be sent by specialty pharmacy team with patient's initial fill.    Attestation  Therapeutic appropriateness: Appropriate   I attest the patient was actively involved in and has agreed to the above plan of care. If the prescribed therapy is at any point deemed not appropriate based on the current or future assessments, a consultation will be initiated with the patient's specialty care provider to determine the best course of action. The revised plan of therapy will be documented along with any additional  patient education provided. Discussed aforementioned material with patient via telemedicine.    Donta Rodriguez, PharmD  Clinic Specialty Pharmacist, Neurology  2/25/2025  13:49 EST

## 2025-02-27 ENCOUNTER — SPECIALTY PHARMACY (OUTPATIENT)
Dept: INFUSION THERAPY | Facility: HOSPITAL | Age: 30
End: 2025-02-27
Payer: COMMERCIAL

## 2025-02-28 ENCOUNTER — SPECIALTY PHARMACY (OUTPATIENT)
Dept: INFUSION THERAPY | Facility: HOSPITAL | Age: 30
End: 2025-02-28
Payer: COMMERCIAL

## 2025-02-28 ENCOUNTER — TELEPHONE (OUTPATIENT)
Dept: INFUSION THERAPY | Facility: HOSPITAL | Age: 30
End: 2025-02-28
Payer: COMMERCIAL

## 2025-02-28 ENCOUNTER — OFFICE VISIT (OUTPATIENT)
Dept: FAMILY MEDICINE CLINIC | Facility: CLINIC | Age: 30
End: 2025-02-28
Payer: COMMERCIAL

## 2025-02-28 ENCOUNTER — DOCUMENTATION (OUTPATIENT)
Dept: NEUROLOGY | Facility: CLINIC | Age: 30
End: 2025-02-28
Payer: COMMERCIAL

## 2025-02-28 VITALS
SYSTOLIC BLOOD PRESSURE: 100 MMHG | HEART RATE: 84 BPM | DIASTOLIC BLOOD PRESSURE: 68 MMHG | BODY MASS INDEX: 24.34 KG/M2 | OXYGEN SATURATION: 99 % | RESPIRATION RATE: 18 BRPM | WEIGHT: 137.4 LBS | HEIGHT: 63 IN | TEMPERATURE: 98.1 F

## 2025-02-28 DIAGNOSIS — M25.511 CHRONIC RIGHT SHOULDER PAIN: Primary | ICD-10-CM

## 2025-02-28 DIAGNOSIS — G89.29 CHRONIC RIGHT SHOULDER PAIN: Primary | ICD-10-CM

## 2025-02-28 DIAGNOSIS — F41.9 ANXIETY: ICD-10-CM

## 2025-02-28 PROCEDURE — 99213 OFFICE O/P EST LOW 20 MIN: CPT | Performed by: FAMILY MEDICINE

## 2025-02-28 RX ORDER — TOPIRAMATE 25 MG/1
TABLET, FILM COATED ORAL
Qty: 49 TABLET | Refills: 0 | Status: SHIPPED | OUTPATIENT
Start: 2025-02-28

## 2025-02-28 NOTE — PROGRESS NOTES
Patient and left a voicemail asking her to call back or get on [employer] to communicate regarding denial of injectable medication to prevent migraine.  She is currently doing Nurtec every other day and continuing to have migraines on her off days.  I was concerned about starting an additional medication because she has  low blood pressure and orthostatic hypotension and so needs to avoid any blood pressure medication.  She is already on does venlafaxine.  Wanted to avoid other medications that can increase her risk for serotonin syndrome.    May consider starting low-dose topiramate or low-dose amitriptyline with the known risk of increasing SS.    Awaiting return call

## 2025-02-28 NOTE — PROGRESS NOTES
"Chief Complaint  Work Related Injury, Depression, and Shoulder Pain    Subjective        Alaina Arzate presents to [facility]  Depression  Presents for follow-up visit. Symptoms include depressed mood, excessive worry, irritability, muscle tension, nervousness/anxiety and dizziness. Patient is not experiencing: confusion, decreased concentration, suicidal ideas, suicidal planning, thoughts of death and chest pain.Symptoms occur constantly.   Her past medical history is significant for depression.   Shoulder Injury   The incident occurred at work. The right shoulder is affected. The incident occurred more than 1 week ago (about 3 weeks ago). 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 The quality of the pain is described as aching. The pain radiates to the back. The pain is moderate. Pertinent negatives include no chest pain, muscle weakness, numbness or tingling.       Objective   Vital Signs:  /68 (BP Location: Left arm, Patient Position: Sitting, Cuff Size: Adult)   Pulse 84   Temp 98.1 °F (36.7 °C) (Temporal)   Resp 18   Ht 160 cm (63\")   Wt 62.3 kg (137 lb 6.4 oz)   SpO2 99%   BMI 24.34 kg/m²   Estimated body mass index is 24.34 kg/m² as calculated from the following:    Height as of this encounter: 160 cm (63\").    Weight as of this encounter: 62.3 kg (137 lb 6.4 oz).    BMI is within normal parameters. No other follow-up for BMI required.      Physical Exam  Vitals and nursing note reviewed.   Constitutional:       General: She is not in acute distress.     Appearance: She is well-developed.   HENT:      Head: Normocephalic.   Eyes:      General: Lids are normal.      Conjunctiva/sclera: Conjunctivae normal.   Neck:      Thyroid: No thyroid mass or thyromegaly.      Trachea: Trachea normal.   Cardiovascular:      Rate and Rhythm: Normal rate and regular rhythm.      Heart sounds: Normal " heart sounds.   Pulmonary:      Effort: Pulmonary effort is normal.      Breath sounds: Normal breath sounds.   Musculoskeletal:      Right shoulder: Tenderness present. No swelling, deformity or laceration. Normal range of motion.      Cervical back: Normal range of motion.   Lymphadenopathy:      Cervical: No cervical adenopathy.   Skin:     General: Skin is warm and dry.   Neurological:      Mental Status: She is alert and oriented to person, place, and time.   Psychiatric:         Attention and Perception: She is attentive.         Mood and Affect: Mood normal.         Speech: Speech normal.         Behavior: Behavior normal.        Result Review :  The following data was reviewed by: Maria M Villeda MD on 02/28/2025:  Common labs          5/16/2024    08:55   Common Labs   Glucose 95    BUN 8    Creatinine 0.68    Sodium 138    Potassium 4.1    Chloride 102    Calcium 9.5    Albumin 4.3    Total Bilirubin 0.3    Alkaline Phosphatase 38    AST (SGOT) 26    ALT (SGPT) 53    WBC 5.41    Hemoglobin 14.0    Hematocrit 41.4    Platelets 273    Total Cholesterol 183    Triglycerides 99    HDL Cholesterol 80    LDL Cholesterol  85                Assessment and Plan   Diagnoses and all orders for this visit:    1. Chronic right shoulder pain (Primary)  Assessment & Plan:  Apply a compressive ACE bandage. Rest and elevate the affected painful area.  Apply cold compresses intermittently as needed.  As pain recedes, begin normal activities slowly as tolerated.  Call if symptoms persist.  Paperwork filled out for her employer to change to a less strenuous job.      2. Anxiety  Assessment & Plan:  She has recently added Wellbutrin but is feeling a bit more anxious since starting it.  She will give it a try for about 10 days and let me know how she is feeling.                Follow Up   No follow-ups on file.  Patient was given instructions and counseling regarding her condition or for health maintenance advice. Please see  specific information pulled into the AVS if appropriate.

## 2025-02-28 NOTE — ASSESSMENT & PLAN NOTE
She has recently added Wellbutrin but is feeling a bit more anxious since starting it.  She will give it a try for about 10 days and let me know how she is feeling.

## 2025-02-28 NOTE — ASSESSMENT & PLAN NOTE
Apply a compressive ACE bandage. Rest and elevate the affected painful area.  Apply cold compresses intermittently as needed.  As pain recedes, begin normal activities slowly as tolerated.  Call if symptoms persist.  Paperwork filled out for her employer to change to a less strenuous job.

## 2025-03-21 ENCOUNTER — HOSPITAL ENCOUNTER (OUTPATIENT)
Dept: MRI IMAGING | Facility: HOSPITAL | Age: 30
Discharge: HOME | End: 2025-03-21
Admitting: NURSE PRACTITIONER
Payer: COMMERCIAL

## 2025-03-21 DIAGNOSIS — G43.719 INTRACTABLE CHRONIC MIGRAINE WITHOUT AURA AND WITHOUT STATUS MIGRAINOSUS: ICD-10-CM

## 2025-03-21 PROCEDURE — 25010000002 GADOTERIDOL PER 1 ML: Performed by: NURSE PRACTITIONER

## 2025-03-21 PROCEDURE — A9579 GAD-BASE MR CONTRAST NOS,1ML: HCPCS | Performed by: NURSE PRACTITIONER

## 2025-03-21 PROCEDURE — 70553 MRI BRAIN STEM W/O & W/DYE: CPT

## 2025-03-21 RX ADMIN — GADOTERIDOL 12.46 ML: 279.3 INJECTION, SOLUTION INTRAVENOUS at 14:25

## 2025-03-24 ENCOUNTER — SPECIALTY PHARMACY (OUTPATIENT)
Dept: INFUSION THERAPY | Facility: HOSPITAL | Age: 30
End: 2025-03-24
Payer: COMMERCIAL

## 2025-03-24 NOTE — PROGRESS NOTES
The patient called in to advise that since since increasing to topiramate 25mg twice daily she has noticed an increase in overall anxiety and worsening depression. She also reports brain fog and not being able to remember tasks and what she's done throughout the day. The patient is noticeably upset and I have advised to taper off of topiramate by taking 25mg every evening for seven days before stopping completely.  I have restarted the prior authorization for Ajovy, which was originally denied until the patient tried and failed topiramate/divalproex or propranolol.   Gregor Rodriguez, [facility]  03/24/25  11:27 EDT

## 2025-03-26 RX ORDER — DESVENLAFAXINE 25 MG/1
25 TABLET, EXTENDED RELEASE ORAL DAILY
Qty: 30 TABLET | Refills: 1 | Status: SHIPPED | OUTPATIENT
Start: 2025-03-26

## 2025-03-28 ENCOUNTER — RESULTS FOLLOW-UP (OUTPATIENT)
Dept: NEUROLOGY | Facility: CLINIC | Age: 30
End: 2025-03-28
Payer: COMMERCIAL

## 2025-03-28 DIAGNOSIS — G43.719 INTRACTABLE CHRONIC MIGRAINE WITHOUT AURA AND WITHOUT STATUS MIGRAINOSUS: Primary | ICD-10-CM

## 2025-04-01 ENCOUNTER — SPECIALTY PHARMACY (OUTPATIENT)
Dept: INFUSION THERAPY | Facility: HOSPITAL | Age: 30
End: 2025-04-01
Payer: COMMERCIAL

## 2025-04-03 ENCOUNTER — SPECIALTY PHARMACY (OUTPATIENT)
Dept: INFUSION THERAPY | Facility: HOSPITAL | Age: 30
End: 2025-04-03
Payer: COMMERCIAL

## 2025-04-04 ENCOUNTER — SPECIALTY PHARMACY (OUTPATIENT)
Dept: INFUSION THERAPY | Facility: HOSPITAL | Age: 30
End: 2025-04-04
Payer: COMMERCIAL

## 2025-04-04 DIAGNOSIS — G43.719 INTRACTABLE CHRONIC MIGRAINE WITHOUT AURA AND WITHOUT STATUS MIGRAINOSUS: Primary | ICD-10-CM

## 2025-04-04 NOTE — PROGRESS NOTES
Specialty Pharmacy Refill Coordination Note     Alaina is a 29 y.o. female contacted today regarding refills of her specialty medication(s).    Specialty medication(s) and dose(s) confirmed: Ajovy 225mg  Changes to medications: no  Changes to insurance: no  Reviewed and verified with patient:             Delivery Questions      Flowsheet Row Most Recent Value   Delivery method UPS   Delivery address verified with patient/caregiver? Yes   Delivery address Home   Medication(s) being filled and delivered Fremanezumab-vfrm   Doses left of specialty medications new start   Copay verified? Yes   Copay amount $0   Copay form of payment No copayment ($0)   Delivery Date Selection 04/08/25   Signature Required No   Do you consent to receive electronic handouts?  Yes                 Follow-up: 25 day(s)     Gregor Rodriguez [facility]  4/4/2025   16:24 EDT

## 2025-04-08 RX ORDER — TOPIRAMATE 25 MG/1
TABLET, FILM COATED ORAL
Qty: 49 TABLET | Refills: 0 | Status: SHIPPED | OUTPATIENT
Start: 2025-04-08

## 2025-05-01 ENCOUNTER — TELEPHONE (OUTPATIENT)
Dept: NEUROLOGY | Facility: CLINIC | Age: 30
End: 2025-05-01
Payer: COMMERCIAL

## 2025-05-01 NOTE — TELEPHONE ENCOUNTER
Caller: Alaina Arzate    Relationship: Self    Best call back number: [phone]     What was the call regarding: THE PT NEEDS CLARIFICATION IF SHE IS TO GET HER FOLLOW UP MRI IN 3 MONTHS OR 6 MONTHS.     PLEASE ADVISE  THANK YOU

## 2025-05-02 ENCOUNTER — SPECIALTY PHARMACY (OUTPATIENT)
Dept: INFUSION THERAPY | Facility: HOSPITAL | Age: 30
End: 2025-05-02
Payer: COMMERCIAL

## 2025-05-02 NOTE — PROGRESS NOTES
Specialty Pharmacy Patient Management Program  Refill Outreach     [facility] was contacted today regarding refills of their medication(s).    Refill Questions      Flowsheet Row Most Recent Value   Changes to allergies? No   Changes to medications? No   New conditions or infections since last clinic visit No   Unplanned office visit, urgent care, ED, or hospital admission in the last 4 weeks  No   How does patient/caregiver feel medication is working? Very good   Financial problems or insurance changes  No   Since the previous refill, were any specialty medication doses or scheduled injections missed or delayed?  No   Does this patient require a clinical escalation to a pharmacist? No            Delivery Questions      Flowsheet Row Most Recent Value   Delivery method UPS   Delivery address verified with patient/caregiver? Yes   Delivery address Home   Other address preferred n/a   Number of medications in delivery 1   Medication(s) being filled and delivered Fremanezumab-vfrm   Doses left of specialty medications 0   Copay verified? Yes   Copay amount 0   Copay form of payment No copayment ($0)   Delivery Date Selection 05/06/25   Signature Required No   Do you consent to receive electronic handouts?  No                 Follow-up: 25 day(s)     Wendy Prieto, Pharmacy Technician  5/2/2025  15:00 EDT

## 2025-05-19 RX ORDER — DESVENLAFAXINE 25 MG/1
25 TABLET, EXTENDED RELEASE ORAL DAILY
Qty: 30 TABLET | Refills: 0 | Status: SHIPPED | OUTPATIENT
Start: 2025-05-19

## 2025-05-29 ENCOUNTER — SPECIALTY PHARMACY (OUTPATIENT)
Dept: INFUSION THERAPY | Facility: HOSPITAL | Age: 30
End: 2025-05-29
Payer: COMMERCIAL

## 2025-05-29 NOTE — PROGRESS NOTES
Specialty Pharmacy Patient Management Program  Refill Outreach     [facility] was contacted today regarding refills of their medication(s).    Refill Questions      Flowsheet Row Most Recent Value   Changes to allergies? No   Changes to medications? No   New conditions or infections since last clinic visit No   Unplanned office visit, urgent care, ED, or hospital admission in the last 4 weeks  No   How does patient/caregiver feel medication is working? Very good   Financial problems or insurance changes  No   Since the previous refill, were any specialty medication doses or scheduled injections missed or delayed?  No   Does this patient require a clinical escalation to a pharmacist? No            Delivery Questions      Flowsheet Row Most Recent Value   Delivery method UPS   Delivery address verified with patient/caregiver? Yes   Delivery address Home   Other address preferred n/a   Number of medications in delivery 1   Medication(s) being filled and delivered Fremanezumab-vfrm   Doses left of specialty medications 0   Copay verified? Yes   Copay amount 0   Copay form of payment No copayment ($0)   Delivery Date Selection 05/30/25   Signature Required No   Do you consent to receive electronic handouts?  Yes                 Follow-up: 25 day(s)     Wendy Prieto, Pharmacy Technician  5/29/2025  09:53 EDT

## 2025-05-30 ENCOUNTER — SPECIALTY PHARMACY (OUTPATIENT)
Dept: INFUSION THERAPY | Facility: HOSPITAL | Age: 30
End: 2025-05-30
Payer: COMMERCIAL

## 2025-05-30 NOTE — PROGRESS NOTES
Specialty Pharmacy Patient Management Program  Refill Outreach     error     Wendy Prieto, Pharmacy Technician  5/30/2025  13:45 EDT

## 2025-06-04 NOTE — PROGRESS NOTES
Chief Complaint  Migraine    Subjective          Alaina Arzate presents to [facility] for   History of Present Illness    Alaina Arzate is a 29-year-old female seen today in follow-up for migraine headache without aura.  She was last seen as a new patient on 2/21/2025.  Migraines present since her teen years and was having migraines approximately 3 times a week but started taking Nurtec as a preventative every other day for 1 year and migraines have reduced to once a week.  Unfortunately, the patient was having migraines on her off days and was unable to use triptan due to intolerable side effects.  She was switched to Ajovy monthly and recommend she continue Nurtec 75 mg as needed for acute migraine.    Patient states that she has had 1 migraine since I saw her last.  She has received 1 injection of Ajovy.  On her second injection she pulled the needle away too quickly and did not seem to get most of the medication.  She states the injection is very painful.  She states Nurtec is effective in treating her migraine, especially if she takes it immediately.      Follow up on migraine  Medication: nurtec--ajovy   Frequency-once  Duration- couple of hours  Change in migraine-better        =================================================================  2-21-25 Tracey SLOAN Office Visit  Assessment and Plan    Diagnoses and all orders for this visit:     1. Intractable chronic migraine without aura and without status migrainosus (Primary)  -     MRI Brain With & Without Contrast; Future  -     Fremanezumab-vfrm 225 MG/1.5ML solution auto-injector; Inject 225 mg under the skin into the appropriate area as directed Every 30 (Thirty) Days.  Dispense: 1.68 mL; Refill: 5        Alaina Arzate is seen today as a new patient for migraine headaches.  The patient has had migraines since her teens.  She was originally having migraines approximately 3 times a week but has been taking Nurtec  every other day since last summer and now has migraine once a week.  Unfortunately, migraines occur on her  days off Nurtec and she has been unable to use Maxalt or sumatriptan due to side effects.  She has tried Ubrelvy but has trouble swallowing pills and gags easily.     I would like to switch her to a monthly injectable.  She has episodes of orthostatic hypotension and said like to avoid medication like propranolol.  Also will avoid topiramate since she is on Pristiq and this can contribute to serotonin when taken in conjunction.  Will order [place] to [facility] pharmacy.       I would like for her to continue Nurtec 75 mg as needed for acute migraine.  She can also take Aleve or Tylenol as needed.  She should avoid ibuprofen Excedrin Migraine more than 3 times a week as it can cause rebound/medication overuse headache.     We discussed lifestyle modifications including increasing her water intake and reducing her caffeine intake.  Reducing caffeine may help regulate her sleep better.  I would like for her to start an exercise routine.  This may also improve her generalized fatigue that she has during the day.  I also encouraged her to try to go to bed sooner and get at least 8 hours of sleep each night.  I would also like the patient to do a migraine diary to see if there is any other triggers besides sugar and hormonal fluctuations.     Will order MRI brain with and without contrast to get a baseline.  The patient expresses concern about possible brain tumor.     Dizziness sounds like orthostatic hypotension.  I encouraged her to increase her water intake to  ounces/day.     She will follow-up in 3 to 4 months, but is encouraged to contact the office in the meantime with questions or concerns.    Current Outpatient Medications:     Desvenlafaxine Succinate ER 25 MG tablet sustained-release 24 hour, TAKE 1 TABLET BY MOUTH DAILY, Disp: 30 tablet, Rfl: 0    Fremanezumab-vfrm 225 MG/1.5ML solution  "auto-injector, Inject 225 mg under the skin into the appropriate area as directed Every 30 (Thirty) Days., Disp: 1.68 mL, Rfl: 5    rimegepant sulfate ODT (Nurtec) 75 MG disintegrating tablet, Take 75 mg ODT as needed at the onset of acute migraine.  Max dose 75 mg / 24-hour, Disp: 16 tablet, Rfl: 5    Valarie 3-0.03 MG per tablet, Take 1 tablet by mouth Daily., Disp: , Rfl:     lidocaine-prilocaine (EMLA) 2.5-2.5 % cream, Apply small amount to injection site prior to Ajovy injection monthly., Disp: 1 each, Rfl: 2    Review of Systems   Neurological:  Positive for headaches.   All other systems reviewed and are negative.         Objective:    Vital Signs:   /68   Pulse 74   Ht 160 cm (63\")   Wt 59 kg (130 lb)   BMI 23.03 kg/m²     Physical Exam  Vitals reviewed.   Constitutional:       Appearance: She is well-developed.   HENT:      Head: Normocephalic and atraumatic.   Eyes:      General: Lids are normal.      Extraocular Movements: Extraocular movements intact.      Pupils: Pupils are equal, round, and reactive to light.   Pulmonary:      Effort: Pulmonary effort is normal.   Musculoskeletal:      Cervical back: Normal range of motion and neck supple.   Skin:     General: Skin is warm and dry.   Neurological:      Mental Status: She is oriented to person, place, and time.      Cranial Nerves: Cranial nerves 2-12 are intact. No cranial nerve deficit.      Motor: Motor function is intact. No tremor.      Coordination: Finger-Nose-Finger Test normal. Rapid alternating movements normal.      Gait: Gait normal.   Psychiatric:         Attention and Perception: Attention normal.         Mood and Affect: Mood normal.         Speech: Speech normal.         Behavior: Behavior normal.         Cognition and Memory: Cognition and memory normal.         Judgment: Judgment normal.        Result Review :              MRI BRAIN W WO CONTRAST   Date of Exam: 3/21/2025 1:55 PM EDT   Indication: MS protocol.   " Impression:  Few faint nonspecific areas of subcortical white matter signal abnormality are present, without suspicious imaging features to further suggest demyelinating process and more likely to reflect some prominent physiologic terminal zones of myelination or   potentially migrainous sequela.   Otherwise essentially normal contrast-enhanced MRI of the brain.     Electronically Signed: Oliver Knutson MD    3/22/2025 1:50 AM EDT         Neurological Exam  Mental Status  Awake, alert and oriented to person, place and time. Oriented to person, place, and time. Memory is normal. Speech is normal.    Cranial Nerves  CN II: Visual acuity is normal. Visual fields full to confrontation.  CN III, IV, VI: Extraocular movements intact bilaterally. Normal lids and orbits bilaterally. Pupils equal round and reactive to light bilaterally.  CN V: Facial sensation is normal.  CN VII: Full and symmetric facial movement.  CN IX, X: Palate elevates symmetrically. Normal gag reflex.  CN XI: Shoulder shrug strength is normal.  CN XII: Tongue midline without atrophy or fasciculations.    Motor  Normal muscle bulk throughout. Normal muscle tone. No abnormal involuntary movements. No pronator drift.    Sensory  Light touch is normal in upper and lower extremities.     Coordination  No tremorRight: Finger-to-nose normal. Rapid alternating movement normal.Left: Finger-to-nose normal. Rapid alternating movement normal.    Gait   Normal gait.Casual gait is normal including stance, stride, and arm swing. Able to rise from chair without using arms.         Assessment and Plan    Diagnoses and all orders for this visit:    1. Intractable chronic migraine without aura and without status migrainosus (Primary)  -     rimegepant sulfate ODT (Nurtec) 75 MG disintegrating tablet; Take 75 mg ODT as needed at the onset of acute migraine.  Max dose 75 mg / 24-hour  Dispense: 16 tablet; Refill: 5    2. Pain at injection site, sequela  -      lidocaine-prilocaine (EMLA) 2.5-2.5 % cream; Apply small amount to injection site prior to Ajovy injection monthly.  Dispense: 1 each; Refill: 2       Alaina Arzate is seen today in follow-up for migraine headache.  She states her migraines have significantly improved with Ajovy monthly and Nurtec 75 mg ODT as needed for acute migraine.  She states that she has had 1 migraine since I saw her last.  She does report that Ajovy injection is very painful and her last injection made her jump and she did not receive the full dose.    Continue Ajovy monthly    Will order EMLA cream to be dispensed with Ajovy injections.  She should apply this 2 to 5 minutes prior to injection to reduce pain.    Continue Nurtec 75 mg ODT as needed for acute migraine.    Follow-up 6 months        Follow Up   Return in about 6 months (around 12/5/2025).  Patient was given instructions and counseling regarding her condition or for health maintenance advice. Please see specific information pulled into the AVS if appropriate.     This document has been electronically signed by LUTHER Canela  on June 5, 2025 10:26 EDT

## 2025-06-05 ENCOUNTER — SPECIALTY PHARMACY (OUTPATIENT)
Dept: INFUSION THERAPY | Facility: HOSPITAL | Age: 30
End: 2025-06-05
Payer: COMMERCIAL

## 2025-06-05 ENCOUNTER — OFFICE VISIT (OUTPATIENT)
Dept: NEUROLOGY | Facility: CLINIC | Age: 30
End: 2025-06-05
Payer: COMMERCIAL

## 2025-06-05 VITALS
HEIGHT: 63 IN | BODY MASS INDEX: 23.04 KG/M2 | HEART RATE: 74 BPM | SYSTOLIC BLOOD PRESSURE: 101 MMHG | DIASTOLIC BLOOD PRESSURE: 68 MMHG | WEIGHT: 130 LBS

## 2025-06-05 DIAGNOSIS — G43.719 INTRACTABLE CHRONIC MIGRAINE WITHOUT AURA AND WITHOUT STATUS MIGRAINOSUS: Primary | ICD-10-CM

## 2025-06-05 DIAGNOSIS — T80.89XS PAIN AT INJECTION SITE, SEQUELA: ICD-10-CM

## 2025-06-05 DIAGNOSIS — R52 PAIN AT INJECTION SITE, SEQUELA: ICD-10-CM

## 2025-06-05 PROBLEM — T80.89XA PAIN AT INJECTION SITE: Status: ACTIVE | Noted: 2025-06-05

## 2025-06-05 RX ORDER — LIDOCAINE AND PRILOCAINE 25; 25 MG/G; MG/G
1 CREAM TOPICAL
Qty: 30 G | Refills: 2 | Status: SHIPPED | OUTPATIENT
Start: 2025-06-05

## 2025-06-23 RX ORDER — DESVENLAFAXINE 25 MG/1
25 TABLET, EXTENDED RELEASE ORAL DAILY
Qty: 30 TABLET | Refills: 0 | OUTPATIENT
Start: 2025-06-23

## 2025-06-23 RX ORDER — DESVENLAFAXINE 25 MG/1
25 TABLET, EXTENDED RELEASE ORAL DAILY
Qty: 90 TABLET | Refills: 1 | Status: SHIPPED | OUTPATIENT
Start: 2025-06-23

## 2025-06-24 ENCOUNTER — SPECIALTY PHARMACY (OUTPATIENT)
Dept: INFUSION THERAPY | Facility: HOSPITAL | Age: 30
End: 2025-06-24
Payer: COMMERCIAL

## 2025-06-24 NOTE — PROGRESS NOTES
Specialty Pharmacy Patient Management Program  Refill Outreach     [facility] was contacted today regarding refills of their medication(s).    Refill Questions      Flowsheet Row Most Recent Value   Changes to allergies? No   Changes to medications? No   New conditions or infections since last clinic visit No   Unplanned office visit, urgent care, ED, or hospital admission in the last 4 weeks  No   How does patient/caregiver feel medication is working? Very good   Financial problems or insurance changes  No   Since the previous refill, were any specialty medication doses or scheduled injections missed or delayed?  No   Does this patient require a clinical escalation to a pharmacist? No            Delivery Questions      Flowsheet Row Most Recent Value   Delivery method [employer]   Delivery address verified with patient/caregiver? Yes   Delivery address Home   Other address preferred n/a   Number of medications in delivery 1   Medication(s) being filled and delivered Rimegepant Sulfate (NURTEC-ODT)   Doses left of specialty medications 3   Copay verified? Yes   Copay amount 0   Copay form of payment No copayment ($0)   Delivery Date Selection 06/25/25   Signature Required No   Do you consent to receive electronic handouts?  Yes                 Follow-up: 25 day(s)     Wendy Prieto, Pharmacy Technician  6/24/2025  11:14 EDT

## 2025-07-09 ENCOUNTER — SPECIALTY PHARMACY (OUTPATIENT)
Dept: INFUSION THERAPY | Facility: HOSPITAL | Age: 30
End: 2025-07-09
Payer: COMMERCIAL

## 2025-07-09 NOTE — PROGRESS NOTES
Specialty Pharmacy Patient Management Program  Refill Outreach     [facility] was contacted today regarding refills of their medication(s).    Refill Questions      Flowsheet Row Most Recent Value   Changes to allergies? No   Changes to medications? No   New conditions or infections since last clinic visit No   Unplanned office visit, urgent care, ED, or hospital admission in the last 4 weeks  No   How does patient/caregiver feel medication is working? Very good   Financial problems or insurance changes  No   Since the previous refill, were any specialty medication doses or scheduled injections missed or delayed?  No   Does this patient require a clinical escalation to a pharmacist? No            Delivery Questions      Flowsheet Row Most Recent Value   Delivery method UPS   Delivery address verified with patient/caregiver? Yes   Delivery address Home   Other address preferred N/A   Number of medications in delivery 1   Medication(s) being filled and delivered Fremanezumab-vfrm   Doses left of specialty medications 0   Copay verified? Yes   Copay amount 0   Copay form of payment No copayment ($0)   Delivery Date Selection 07/10/25   Signature Required No   Do you consent to receive electronic handouts?  Yes                 Follow-up: 25 day(s)     Wendy Prieto, Pharmacy Technician  7/9/2025  08:32 EDT

## 2025-07-31 ENCOUNTER — SPECIALTY PHARMACY (OUTPATIENT)
Dept: INFUSION THERAPY | Facility: HOSPITAL | Age: 30
End: 2025-07-31
Payer: COMMERCIAL

## 2025-08-05 ENCOUNTER — SPECIALTY PHARMACY (OUTPATIENT)
Dept: INFUSION THERAPY | Facility: HOSPITAL | Age: 30
End: 2025-08-05
Payer: COMMERCIAL

## 2025-08-07 ENCOUNTER — SPECIALTY PHARMACY (OUTPATIENT)
Dept: INFUSION THERAPY | Facility: HOSPITAL | Age: 30
End: 2025-08-07
Payer: COMMERCIAL

## 2025-08-11 ENCOUNTER — SPECIALTY PHARMACY (OUTPATIENT)
Dept: INFUSION THERAPY | Facility: HOSPITAL | Age: 30
End: 2025-08-11
Payer: COMMERCIAL

## 2025-08-12 ENCOUNTER — SPECIALTY PHARMACY (OUTPATIENT)
Dept: INFUSION THERAPY | Facility: HOSPITAL | Age: 30
End: 2025-08-12
Payer: COMMERCIAL

## 2025-08-13 ENCOUNTER — SPECIALTY PHARMACY (OUTPATIENT)
Dept: INFUSION THERAPY | Facility: HOSPITAL | Age: 30
End: 2025-08-13
Payer: COMMERCIAL

## 2025-08-13 RX ORDER — ATOGEPANT 60 MG/1
60 TABLET ORAL NIGHTLY
Qty: 30 TABLET | Refills: 5 | Status: SHIPPED | OUTPATIENT
Start: 2025-08-13

## 2025-08-18 ENCOUNTER — SPECIALTY PHARMACY (OUTPATIENT)
Dept: INFUSION THERAPY | Facility: HOSPITAL | Age: 30
End: 2025-08-18
Payer: COMMERCIAL

## 2025-08-18 RX ORDER — FREMANEZUMAB-VFRM 225 MG/1.5ML
225 INJECTION SUBCUTANEOUS
Qty: 1.5 ML | Refills: 5 | Status: SHIPPED | OUTPATIENT
Start: 2025-08-18

## 2025-08-19 ENCOUNTER — SPECIALTY PHARMACY (OUTPATIENT)
Dept: INFUSION THERAPY | Facility: HOSPITAL | Age: 30
End: 2025-08-19
Payer: COMMERCIAL